# Patient Record
Sex: FEMALE | Race: WHITE
[De-identification: names, ages, dates, MRNs, and addresses within clinical notes are randomized per-mention and may not be internally consistent; named-entity substitution may affect disease eponyms.]

---

## 2017-01-12 ENCOUNTER — HOSPITAL ENCOUNTER (EMERGENCY)
Dept: HOSPITAL 62 - ER | Age: 27
Discharge: HOME | End: 2017-01-12
Payer: MEDICAID

## 2017-01-12 VITALS — DIASTOLIC BLOOD PRESSURE: 83 MMHG | SYSTOLIC BLOOD PRESSURE: 124 MMHG

## 2017-01-12 DIAGNOSIS — N93.9: ICD-10-CM

## 2017-01-12 DIAGNOSIS — R10.9: ICD-10-CM

## 2017-01-12 DIAGNOSIS — O02.1: Primary | ICD-10-CM

## 2017-01-12 DIAGNOSIS — F17.210: ICD-10-CM

## 2017-01-12 LAB
ALBUMIN SERPL-MCNC: 3.2 G/DL (ref 3.5–5)
ALP SERPL-CCNC: 55 U/L (ref 38–126)
ALT SERPL-CCNC: 11 U/L (ref 9–52)
ANION GAP SERPL CALC-SCNC: 11 MMOL/L (ref 5–19)
AST SERPL-CCNC: 13 U/L (ref 14–36)
BASOPHILS # BLD AUTO: 0 10^3/UL (ref 0–0.2)
BASOPHILS NFR BLD AUTO: 0.5 % (ref 0–2)
BILIRUB DIRECT SERPL-MCNC: 0 MG/DL (ref 0–0.3)
BILIRUB SERPL-MCNC: 0.4 MG/DL (ref 0.2–1.3)
BUN SERPL-MCNC: 7 MG/DL (ref 7–20)
CALCIUM: 8.9 MG/DL (ref 8.4–10.2)
CHLORIDE SERPL-SCNC: 105 MMOL/L (ref 98–107)
CO2 SERPL-SCNC: 23 MMOL/L (ref 22–30)
CREAT SERPL-MCNC: 0.6 MG/DL (ref 0.52–1.25)
EOSINOPHIL # BLD AUTO: 0.3 10^3/UL (ref 0–0.6)
EOSINOPHIL NFR BLD AUTO: 3.1 % (ref 0–6)
ERYTHROCYTE [DISTWIDTH] IN BLOOD BY AUTOMATED COUNT: 12.8 % (ref 11.5–14)
GLUCOSE SERPL-MCNC: 80 MG/DL (ref 75–110)
HCT VFR BLD CALC: 30.5 % (ref 36–47)
HGB BLD-MCNC: 10.5 G/DL (ref 12–15.5)
HGB HCT DIFFERENCE: 1
LYMPHOCYTES # BLD AUTO: 1.7 10^3/UL (ref 0.5–4.7)
LYMPHOCYTES NFR BLD AUTO: 15.6 % (ref 13–45)
MCH RBC QN AUTO: 32 PG (ref 27–33.4)
MCHC RBC AUTO-ENTMCNC: 34.5 G/DL (ref 32–36)
MCV RBC AUTO: 93 FL (ref 80–97)
MONOCYTES # BLD AUTO: 0.8 10^3/UL (ref 0.1–1.4)
MONOCYTES NFR BLD AUTO: 7.4 % (ref 3–13)
NEUTROPHILS # BLD AUTO: 7.9 10^3/UL (ref 1.7–8.2)
NEUTS SEG NFR BLD AUTO: 73.4 % (ref 42–78)
POTASSIUM SERPL-SCNC: 3.8 MMOL/L (ref 3.6–5)
PROT SERPL-MCNC: 5.7 G/DL (ref 6.3–8.2)
RBC # BLD AUTO: 3.29 10^6/UL (ref 3.72–5.28)
SODIUM SERPL-SCNC: 138.8 MMOL/L (ref 137–145)
WBC # BLD AUTO: 10.7 10^3/UL (ref 4–10.5)

## 2017-01-12 PROCEDURE — 85025 COMPLETE CBC W/AUTO DIFF WBC: CPT

## 2017-01-12 PROCEDURE — 36415 COLL VENOUS BLD VENIPUNCTURE: CPT

## 2017-01-12 PROCEDURE — 84702 CHORIONIC GONADOTROPIN TEST: CPT

## 2017-01-12 PROCEDURE — 80053 COMPREHEN METABOLIC PANEL: CPT

## 2017-01-12 PROCEDURE — 99284 EMERGENCY DEPT VISIT MOD MDM: CPT

## 2017-01-12 PROCEDURE — 76817 TRANSVAGINAL US OBSTETRIC: CPT

## 2017-01-12 NOTE — ER DOCUMENT REPORT
ED GI/





- General


Information source: Patient


TRAVEL OUTSIDE OF THE [place] IN LAST 30 DAYS: No





- HPI


Patient complains to provider of: Vaginal bleeding, Vaginal discharge


Onset: Yesterday


Timing/Duration: Gradual, Worse


Quality of pain: Cramping


Vaginal bleeding (Compared to normal period): Heavier, Passing clots


Para: 1


Associated symptoms: Vaginal discharge





<ERROL MORRIS - Last Filed: 17 08:31>





<GASTON WHITFIELD - Last Filed: 17 11:03>





- General


Chief Complaint: Abdominal Pain


Stated Complaint: ABDOMINAL PAIN


Notes: 


Patient is a pregnant 26-year-old female presenting to the emergency department 

concerned of vaginal bleeding onset last night.  Patient states that at 

approximately 3 AM she passed a sac with her vaginal bleeding, and her bleeding 

has worsened since then with multiple other small clots.  Patient states that 

she has been cramping for the past 4-5 days, and now she is experiencing pain 

in her abdomen and back.  Patient has a history of miscarriage, most recent 

being 2016.  Patient was seen here 2016 where she had a positive 

pregnancy test, but there was nothing visible on the ultrasound.


 (ERROL MORRIS)





- Related Data


Allergies/Adverse Reactions: 


 





amoxicillin [Amoxicillin] Allergy (Verified 17 07:30)


 


clindamycin Allergy (Verified 17 07:30)


 


hydrocortisone Allergy (Verified 17 07:30)


 


Penicillins Allergy (Verified 17 07:30)


 











Past Medical History





- General


Information source: Patient





- Social History


Smoking Status: Current Every Day Smoker


Chew tobacco use (# tins/day): No


Frequency of alcohol use: None


Drug Abuse: None


Family History: Reviewed & Not Pertinent


Patient has suicidal ideation: No


Patient has homicidal ideation: No


Renal/ Medical History: Reports: Other - Miscarriage


Psychiatric Medical History: Reports: Hx Anxiety, Hx Depression


Past Surgical History: Reports: Hx  Section - 2009 emergency





- Immunizations


Hx Diphtheria, Pertussis, Tetanus Vaccination: Yes





<ERROL MORRIS - Last Filed: 17 08:31>





Review of Systems





- Review of Systems


Constitutional: No symptoms reported


EENT: No symptoms reported


Cardiovascular: No symptoms reported


Respiratory: No symptoms reported


Gastrointestinal: See HPI, Abdominal pain - with accompanied back pain


Genitourinary: No symptoms reported


Female Genitourinary: See HPI, Pregnant, Vaginal discharge, Vaginal bleeding, 

Other - Cramping


Musculoskeletal: No symptoms reported


Skin: No symptoms reported


Hematologic/Lymphatic: No symptoms reported


Neurological/Psychological: No symptoms reported


-: Yes All other systems reviewed and negative





<ERROL MORRIS - Last Filed: 17 08:31>





Physical Exam





- Vital signs


Interpretation: Tachycardic





- General


General appearance: Alert





- HEENT


Head: Normocephalic, Atraumatic


Eyes: Normal


Pupils: PERRL





- Respiratory


Respiratory status: No respiratory distress


Chest status: Nontender


Breath sounds: Normal


Chest palpation: Normal





- Cardiovascular


Rhythm: Regular


Heart sounds: Normal auscultation


Murmur: No





- Abdominal


Inspection: Obese


Distension: No distension


Bowel sounds: Normal


Tenderness: Tender - Diffuse tenderness to palpation


Organomegaly: No organomegaly





- Back


Back: Normal, Nontender





- Extremities


General upper extremity: Normal inspection, Nontender, Normal color, Normal ROM

, Normal temperature


General lower extremity: Normal inspection, Nontender, Normal color, Normal ROM

, Normal temperature





- Neurological


Neuro grossly intact: Yes


Cognition: Normal


[place] Coma Scale Eye Opening: Spontaneous


Nini Coma Scale Verbal: Oriented


[place] Coma Scale Motor: Obeys Commands


[place] Coma Scale Total: 15


Speech: Normal





- Psychological


Associated symptoms: Normal affect, Tearful





- Skin


Skin Temperature: Warm


Skin Moisture: Dry


Skin Color: Normal





<ERROL MORRIS - Last Filed: 17 08:31>





Course





- Laboratory


Result Diagrams: 


 17 08:44





 17 08:44





<GASTON WHITFIELD - Last Filed: 17 11:03>





- Vital Signs


Vital signs: 





 











Temp Pulse Resp BP Pulse Ox


 


 98.5 F   110 H  16   132/76 H  100 


 


 17 07:30  17 07:30  17 07:30  17 07:30  17 07:30








 (ERROL MORRIS)


 (GASTON WHITFIELD)





- Laboratory


Laboratory results interpreted by me: 





 











  17





  08:44 08:44


 


WBC  10.7 H 


 


RBC  3.29 L 


 


Hgb  10.5 L 


 


Hct  30.5 L 


 


AST   13 L


 


Total Protein   5.7 L


 


Albumin   3.2 L


 


Beta HCG, Quant   14435.00 H











 (GASTON WHITFIELD)





Discharge





<ERROL MORRIS - Last Filed: 17 08:31>





<GASTON WHITFIELD - Last Filed: 17 11:03>





- Discharge


Clinical Impression: 


 Missed 





Condition: Stable


Disposition: HOME, SELF-CARE


Additional Instructions: 


Miscarriage:





     You have PROBABLY had a miscarriage (medically called a "spontaneous 

").  The miscarriage occurred because the fetus did not develop 

normally.  There is nothing you did to cause it, and nothing you could have 

done to prevent it.  About one pregnancy in four ends in miscarriage.


     You should rest in bed for two or three days.  As there is some risk of 

infection of the uterus, you should not have intercourse for one week (or until 

okayed by your physician).


     You might not have a period for six to eight weeks.  You should not become 

pregnant again for at least three months -- the uterus requires time to get 

back to normal.


     Call the doctor or return for re-examination if there is heavy or 

persistent vaginal bleeding, fever, foul discharge, continued cramping pains, 

or abdominal pain.














////////////////////////////////////////////////////////////////////////////////

////////////////////////////////////////////////////////////////////////////////

/////////////////


The ultrasound did not show a pregnancy, it did show thickened endometrium 

which is suggestive of a recent pregnancy and miscarriage.


The pregnancy hormone level was low for dates.


You will need repeat pregnancy hormone level testing to ensure if the level is 

returning back to zero.


Follow-up with your doctor, or with [facility] on Monday.





RETURN TO THE EMERGENCY ROOM IF ANY NEW OR WORSENING SYMPTOMS.








Referrals: 


[facility] MD [Primary Care Provider] - Follow up in 3-5 days


[facility] [Provider Group] - Follow up in 3-5 days


Scribe Attestation: 





17 11:03


I personally performed the services described in the documentation, reviewed 

and edited the documentation which was dictated to the scribe in my presence, 

and it accurately records my words and actions. (GASTON WHITFIELD)





Scribe Documentation





<ERROL MORRIS - Last Filed: 17 08:31>





<GASTON WHITFIELD - Last Filed: 17 11:03>





- Scribe


Written by Scribe:: 


GASTON WHITFIELD MD, SCRIBE  [id number]





Acting as scribe for:





Dr. Whitfield (ERROL MORRIS)


 (GASTON WHITFIELD)

## 2017-05-07 ENCOUNTER — HOSPITAL ENCOUNTER (EMERGENCY)
Dept: HOSPITAL 62 - ER | Age: 27
Discharge: HOME | End: 2017-05-07
Payer: MEDICAID

## 2017-05-07 VITALS — DIASTOLIC BLOOD PRESSURE: 81 MMHG | SYSTOLIC BLOOD PRESSURE: 126 MMHG

## 2017-05-07 DIAGNOSIS — F17.200: ICD-10-CM

## 2017-05-07 DIAGNOSIS — T63.301A: Primary | ICD-10-CM

## 2017-05-07 PROCEDURE — 99281 EMR DPT VST MAYX REQ PHY/QHP: CPT

## 2017-05-07 NOTE — ER DOCUMENT REPORT
HPI





- HPI


Patient complains to provider of: insect bite


Pain Level: 3


Context: 


25 yo female c/o spider bite to left thigh x 1 day. 


Associated Symptoms: None


Exacerbated by: Denies


Relieved by: Denies


Similar symptoms previously: No





- ROS


Systems Reviewed and Negative: Yes All other systems reviewed and negative





- REPRODUCTIVE


Reproductive: DENIES: Pregnant:





- DERM


Skin Color: Normal





Past Medical History





- General


Information source: Patient





- Social History


Smoking Status: Current Every Day Smoker


Frequency of alcohol use: None


Drug Abuse: None


Lives with: Family


Family History: Reviewed & Not Pertinent


Patient has suicidal ideation: No


Patient has homicidal ideation: No


Renal/ Medical History: Denies: Hx Peritoneal Dialysis


Psychiatric Medical History: Reports: Hx Anxiety, Hx Depression


Past Surgical History: Reports: Hx  Section - 2009 emergency





- Immunizations


Hx Diphtheria, Pertussis, Tetanus Vaccination: Yes





Vertical Provider Document





- CONSTITUTIONAL


Agree With Documented VS: Yes


Exam Limitations: No Limitations





- INFECTION CONTROL


TRAVEL OUTSIDE OF THE [place] IN LAST 30 DAYS: No





- HEENT


HEENT: Atraumatic, PERRLA





- NECK


Neck: Normal Inspection, Supple





- RESPIRATORY


Respiratory: Breath Sounds Normal, No Respiratory Distress


O2 Sat by Pulse Oximetry: 100





- CARDIOVASCULAR


Cardiovascular: Regular Rate, Regular Rhythm





- NEURO


Level of Consciousness: Awake, Alert





- DERM


Integumentary: Warm, Dry, Rash - discrete 2mm erosive lesion to left anterior 

thigh.  mild surrounding erythema.  no lymphangitis.  no drainage.





Course





- Vital Signs


Vital signs: 


 











Temp Pulse Resp BP Pulse Ox


 


 98.4 F   80   20   128/67 H  100 


 


 17 12:11  17 12:11  17 12:11  17 12:11  17 12:11














Discharge





- Discharge


Clinical Impression: 


Insect bite


Qualifiers:


 Encounter type: initial encounter Qualified Code(s): W57.XXXA - Bitten or 

stung by nonvenomous insect and other nonvenomous arthropods, initial encounter





Condition: Stable


Disposition: HOME, SELF-CARE


Instructions:  Insect Bites ([facility]), Antibiotic Therapy ([facility]), Antibiotic 

Ointment Protection ([facility]), Ultram ([facility])


Additional Instructions: 


clean wound with antibacterial soap and water,


apply topical antibiotic after cleaning


take oral antibiotics as prescribed


follow up with primary care or return to ER for any worsening


Prescriptions: 


Clindamycin HCl [Cleocin HCl] 150 mg PO QID #20 capsule


Tramadol HCl [Ultram 50 mg Tablet] 50 mg PO Q4H PRN #15 tablet


 PRN Reason: 


Forms:  Return to Work

## 2017-05-15 ENCOUNTER — HOSPITAL ENCOUNTER (OUTPATIENT)
Dept: HOSPITAL 62 - LAB | Age: 27
End: 2017-05-15
Attending: CLINIC/CENTER
Payer: MEDICAID

## 2017-05-15 DIAGNOSIS — O02.1: Primary | ICD-10-CM

## 2017-05-15 PROCEDURE — 36415 COLL VENOUS BLD VENIPUNCTURE: CPT

## 2017-05-15 PROCEDURE — 84702 CHORIONIC GONADOTROPIN TEST: CPT

## 2017-05-31 ENCOUNTER — HOSPITAL ENCOUNTER (OUTPATIENT)
Dept: HOSPITAL 62 - RAD | Age: 27
End: 2017-05-31
Attending: NURSE PRACTITIONER
Payer: MEDICAID

## 2017-05-31 DIAGNOSIS — Z34.81: Primary | ICD-10-CM

## 2017-05-31 PROCEDURE — 76817 TRANSVAGINAL US OBSTETRIC: CPT

## 2017-05-31 NOTE — RADIOLOGY REPORT (SQ)
EXAM DESCRIPTION:  U/S OB TRANSVAGINAL W/O DOP



COMPLETED DATE/TIME:  5/31/2017 3:18 pm



REASON FOR STUDY:  ENCOUNTER FOR SUPERVISION OF OTHER NORMAL PREGNANCY Z34.81  ENCOUNTER FOR SUPRVSN 
OF NORMAL PREGNANCY, FIRST TRIM



COMPARISON:  None.



TECHNIQUE:  Transvaginal static and realtime grayscale images acquired of the pelvis. Additional gonzález
cted spectral and color Doppler images recorded. All images stored on PACs.

bHCG: Not available.



LIMITATIONS:  None.



FINDINGS:  FETUS: Living intrauterine pregnancy.

EGA: 7 week 1 day.

MO: 1/16/2018.

FHR: 131  beats per minute.

SUBCHORIONIC BLEED: No.

SIZE OF BLEED: Not applicable.

UTERUS: No masses. No anomalies.

CERVICAL LENGTH: 4.4 cm.   Closed.

RIGHT ADNEXA: Normal ovary with normal vascular flow.

No adnexal free fluid.

No adnexal masses.

LEFT ADNEXA: Normal ovary with normal vascular flow.

No adnexal free fluid.

No adnexal masses.

FREE FLUID: Small amount of free fluid.

OTHER: No other significant finding.



IMPRESSION:  LIVING INTRAUTERINE PREGNANCY.

EGA 7 WEEK 1 DAY.

Trimester of pregnancy:  First - 0 to 13 weeks.



TECHNICAL DOCUMENTATION:  JOB ID:  [id number]

 2011 [facility]- All Rights Reserved

## 2017-06-16 ENCOUNTER — HOSPITAL ENCOUNTER (EMERGENCY)
Dept: HOSPITAL 62 - ER | Age: 27
Discharge: HOME | End: 2017-06-16
Payer: MEDICAID

## 2017-06-16 VITALS — DIASTOLIC BLOOD PRESSURE: 63 MMHG | SYSTOLIC BLOOD PRESSURE: 121 MMHG

## 2017-06-16 DIAGNOSIS — Z88.0: ICD-10-CM

## 2017-06-16 DIAGNOSIS — O21.1: Primary | ICD-10-CM

## 2017-06-16 DIAGNOSIS — Z87.891: ICD-10-CM

## 2017-06-16 DIAGNOSIS — Z88.3: ICD-10-CM

## 2017-06-16 DIAGNOSIS — Z3A.09: ICD-10-CM

## 2017-06-16 LAB
ALBUMIN SERPL-MCNC: 5 G/DL (ref 3.5–5)
ALP SERPL-CCNC: 85 U/L (ref 38–126)
ALT SERPL-CCNC: 35 U/L (ref 9–52)
ANION GAP SERPL CALC-SCNC: 18 MMOL/L (ref 5–19)
APPEARANCE UR: (no result)
AST SERPL-CCNC: 25 U/L (ref 14–36)
BASOPHILS NFR BLD MANUAL: 0 % (ref 0–2)
BILIRUB DIRECT SERPL-MCNC: 0.3 MG/DL (ref 0–0.4)
BILIRUB SERPL-MCNC: 1.1 MG/DL (ref 0.2–1.3)
BILIRUB UR QL STRIP: (no result)
BUN SERPL-MCNC: 14 MG/DL (ref 7–20)
CALCIUM: 10.4 MG/DL (ref 8.4–10.2)
CHLORIDE SERPL-SCNC: 103 MMOL/L (ref 98–107)
CO2 SERPL-SCNC: 17 MMOL/L (ref 22–30)
CREAT SERPL-MCNC: 0.65 MG/DL (ref 0.52–1.25)
EOSINOPHIL NFR BLD MANUAL: 0 % (ref 0–6)
ERYTHROCYTE [DISTWIDTH] IN BLOOD BY AUTOMATED COUNT: 14.6 % (ref 11.5–14)
GLUCOSE SERPL-MCNC: 133 MG/DL (ref 75–110)
GLUCOSE UR STRIP-MCNC: NEGATIVE MG/DL
HCT VFR BLD CALC: 47.8 % (ref 36–47)
HGB BLD-MCNC: 15.9 G/DL (ref 12–15.5)
HGB HCT DIFFERENCE: -0.1
KETONES UR STRIP-MCNC: 80 MG/DL
MCH RBC QN AUTO: 30.3 PG (ref 27–33.4)
MCHC RBC AUTO-ENTMCNC: 33.3 G/DL (ref 32–36)
MCV RBC AUTO: 91 FL (ref 80–97)
NITRITE UR QL STRIP: NEGATIVE
OVALOCYTES BLD QL SMEAR: SLIGHT
PH UR STRIP: 5 [PH] (ref 5–9)
POIKILOCYTOSIS BLD QL SMEAR: SLIGHT
POTASSIUM SERPL-SCNC: 3.8 MMOL/L (ref 3.6–5)
PROT SERPL-MCNC: 8.4 G/DL (ref 6.3–8.2)
PROT UR STRIP-MCNC: 100 MG/DL
RBC # BLD AUTO: 5.24 10^6/UL (ref 3.72–5.28)
SODIUM SERPL-SCNC: 138.1 MMOL/L (ref 137–145)
SP GR UR STRIP: 1.03
TOTAL CELLS COUNTED BLD: 100
TOXIC GRANULES BLD QL SMEAR: SLIGHT
UROBILINOGEN UR-MCNC: NEGATIVE MG/DL (ref ?–2)
VARIANT LYMPHS NFR BLD MANUAL: 3 % (ref 13–45)
WBC # BLD AUTO: 22.5 10^3/UL (ref 4–10.5)

## 2017-06-16 PROCEDURE — 36415 COLL VENOUS BLD VENIPUNCTURE: CPT

## 2017-06-16 PROCEDURE — 80053 COMPREHEN METABOLIC PANEL: CPT

## 2017-06-16 PROCEDURE — 81001 URINALYSIS AUTO W/SCOPE: CPT

## 2017-06-16 PROCEDURE — 81025 URINE PREGNANCY TEST: CPT

## 2017-06-16 PROCEDURE — 96376 TX/PRO/DX INJ SAME DRUG ADON: CPT

## 2017-06-16 PROCEDURE — 96361 HYDRATE IV INFUSION ADD-ON: CPT

## 2017-06-16 PROCEDURE — 96366 THER/PROPH/DIAG IV INF ADDON: CPT

## 2017-06-16 PROCEDURE — 99283 EMERGENCY DEPT VISIT LOW MDM: CPT

## 2017-06-16 PROCEDURE — 85025 COMPLETE CBC W/AUTO DIFF WBC: CPT

## 2017-06-16 PROCEDURE — 96375 TX/PRO/DX INJ NEW DRUG ADDON: CPT

## 2017-06-16 PROCEDURE — 96365 THER/PROPH/DIAG IV INF INIT: CPT

## 2017-06-16 NOTE — ER DOCUMENT REPORT
ED Medical Screen (RME)





- General


Chief Complaint: Vomiting


Stated Complaint: VOMITING/7 WEEKS PREGNANT


Notes: 


27 year old female,  at 10 weeks gestation by LMP from home pregnancy test, 

reports 3 days of vomiting with abdominal cramping mainly in her upper abdomen. 

Denies fever. No daily meds. PMH of complicated first pregnancy, no other 

medical history reported. She denies alcohol use.





TRAVEL OUTSIDE OF THE [place] IN LAST 30 DAYS: No





- Related Data


Allergies/Adverse Reactions: 


 





amoxicillin [Amoxicillin] Allergy (Verified 17 12:13)


 


clindamycin Allergy (Verified 17 12:13)


 


hydrocortisone Allergy (Verified 17 12:13)


 


Penicillins Allergy (Verified 17 12:13)


 











Past Medical History


Renal/ Medical History: Denies: Hx Peritoneal Dialysis


Psychiatric Medical History: Reports: Hx Anxiety, Hx Depression


Past Surgical History: Reports: Hx  Section -  emergency





- Immunizations


Hx Diphtheria, Pertussis, Tetanus Vaccination: Yes





Physical Exam





- Vital signs


Vitals: 





 











Temp Pulse Resp BP Pulse Ox


 


 98.0 F   88   20   135/86 H  98 


 


 17 04:32  17 04:32  17 04:32  17 04:32  17 04:32














- General


General appearance: Anxious - patient vomited in the room





- Abdominal


Tenderness: Tender - tender in upper abdomen mildly and slightly more tender in 

LUQ; no guarding





Course





- Vital Signs


Vital signs: 





 











Temp Pulse Resp BP Pulse Ox


 


 98.0 F   88   20   135/86 H  98 


 


 17 04:32  17 04:32  17 04:32  17 04:32  17 04:32

## 2017-06-16 NOTE — ER DOCUMENT REPORT
ED GI/





- General


Mode of Arrival: Ambulatory


Information source: Patient


TRAVEL OUTSIDE OF THE [place] IN LAST 30 DAYS: No





- HPI


Patient complains to provider of: Vomiting


Onset: Other - 3 days ago


Timing/Duration: Sudden, Persistent


: 2


Para: 1


Abortions: 0





<ERROL MORRIS - Last Filed: 17 07:14>





<GASTON WHITFIELD - Last Filed: 17 10:21>





- General


Chief Complaint: Vomiting


Stated Complaint: VOMITING/7 WEEKS PREGNANT


Time Seen by Provider: 17 06:35


Notes: 


Patient is a  almost 10 weeks pregnant female presenting to the emergency 

department with chief complaint of nausea and vomiting onset approximately 3 

days ago.  Patient states that usually she only experiences a brief morning 

sickness, and is able to go about her day after she drinks orange juice and 

goes for a walk.  However, the past 3 days she is not been able to eat very much

, and has been dry heaving.  Patient states that she feels a lot better after 

receiving Reglan and 2 L of fluid on arrival to the emergency department.  

Patient reports quitting smoking approximately 2 weeks ago and having an 

emergency  back in May 2009


 (ERROL MORRIS)





- Related Data


Allergies/Adverse Reactions: 


 





amoxicillin [Amoxicillin] Allergy (Verified 17 12:13)


 


clindamycin Allergy (Verified 17 12:13)


 


hydrocortisone Allergy (Verified 17 12:13)


 


Penicillins Allergy (Verified 17 12:13)


 











Past Medical History





- General


Information source: Patient





- Social History


Smoking Status: Former Smoker - Quit 2 weeks ago


Family History: Reviewed & Not Pertinent


Patient has suicidal ideation: No


Patient has homicidal ideation: No


Psychiatric Medical History: Reports: Hx Anxiety, Hx Depression


Past Surgical History: Reports: Hx  Section - May 2009 emergency





- Immunizations


Hx Diphtheria, Pertussis, Tetanus Vaccination: Yes





<ERROL MORRIS - Last Filed: 17 07:14>





Review of Systems





- Review of Systems


Constitutional: No symptoms reported


EENT: No symptoms reported


Cardiovascular: No symptoms reported


Respiratory: No symptoms reported


Gastrointestinal: See HPI, Nausea, Vomiting


Genitourinary: No symptoms reported


Female Genitourinary: See HPI, Pregnant


Musculoskeletal: No symptoms reported


Skin: No symptoms reported


Hematologic/Lymphatic: No symptoms reported


Neurological/Psychological: No symptoms reported


-: Yes All other systems reviewed and negative





<ERROL MORRIS - Last Filed: 17 07:14>





Physical Exam





- General


General appearance: Appears well, Alert





- HEENT


Head: Normocephalic, Atraumatic


Eyes: Normal


Pupils: PERRL





- Respiratory


Respiratory status: No respiratory distress


Chest status: Nontender


Breath sounds: Normal


Chest palpation: Normal





- Cardiovascular


Rhythm: Regular


Heart sounds: Normal auscultation


Murmur: No





- Abdominal


Inspection: Gravid female - soft


Distension: No distension


Bowel sounds: Normal


Tenderness: Nontender


Organomegaly: No organomegaly





- Back


Back: Normal, Nontender





- Extremities


General upper extremity: Normal inspection, Nontender


General lower extremity: Normal inspection, Nontender





- Neurological


Neuro grossly intact: Yes


Cognition: Normal


Orientation: AAOx4


[place] Coma Scale Eye Opening: Spontaneous


[place] Coma Scale Verbal: Oriented


[place] Coma Scale Motor: Obeys Commands


N[place] Coma Scale Total: 15


Speech: Normal





- Psychological


Associated symptoms: Normal affect, Normal mood





- Skin


Skin Temperature: Warm


Skin Moisture: Dry


Skin Color: Normal





<ARTUROERROL - Last Filed: 17 07:14>





Course





- Laboratory


Result Diagrams: 


 17 05:25





 17 05:25





<ARTUROERROL - Last Filed: 17 07:14>





- Laboratory


Result Diagrams: 


 17 05:25





 17 05:25





<GASTON WHITFIELD - Last Filed: 17 10:21>





- Re-evaluation


Re-evalutation: 





17 10:17


Patient has had 4 L of fluid.  The most recent dose of Reglan helped nausea 

quite a bit.  She feels much better and ready to go home. (GASTON WHITFIELD)





- Vital Signs


Vital signs: 





 











Temp Pulse Resp BP Pulse Ox


 


 98.0 F   88   20   135/86 H  98 


 


 17 04:32  17 04:32  17 04:32  17 04:32  17 04:32














- Laboratory


Laboratory results interpreted by me: 





 











  17





  05:25 05:25 06:25


 


WBC  22.5 H  


 


Hgb  15.9 H  


 


Hct  47.8 H  


 


RDW  14.6 H  


 


Seg Neuts % (Manual)  94 H  


 


Lymphocytes % (Manual)  3 L  


 


Abs Neuts (Manual)  21.2 H  


 


Carbon Dioxide   17 L 


 


Glucose   133 H 


 


Calcium   10.4 H 


 


Total Protein   8.4 H 


 


Urine Protein    100 H


 


Urine Ketones    80 H


 


Urine Bilirubin    SMALL H


 


Urine HCG, Qual    POSITIVE H














Discharge





<ERROL MORRIS - Last Filed: 17 07:14>





<GASTON WHITFIELD - Last Filed: 17 10:21>





- Discharge


Clinical Impression: 


 Hyperemesis gravidarum with dehydration





Condition: Stable


Disposition: HOME, SELF-CARE


Additional Instructions: 


Hyperemesis Gravidarum:





     Hyperemesis gravidarum is the medical term for severe vomiting during 

pregnancy.  We don't know exactly why it occurs, but it's a common problem.


     Dehydration can occur.  This reduces blood flow to the placenta, 

decreasing the baby's nourishment.  The baby will also become dehydrated.  

There can be harmful changes in blood sodium, potassium, or acid balance.


     Our goal is to correct, and prevent, dehydration.  For severe cases, we 

give IV fluids.  Antinausea medication will be prescribed. (Don't be concerned 

about "birth defects" -- the risk to you and your baby from the hyperemesis is 

the biggest problem.  The antinausea medication is very safe at this stage of 

pregnancy.)


     Call the doctor if you have vaginal bleeding, abdominal pain, severe 

lightheadedness or weakness, or other alarming symptoms.











TAKE THE MEDICATION AS PRESCRIBED FOR NAUSEA.   SUPPLEMENT WITH BENADRYL IF 

NEEDED.


FOLLOW UP WITH [facility] IF NOT IMPROVING.





RETURN TO THE EMERGENCY ROOM IF ANY NEW OR WORSENING SYMPTOMS.








Prescriptions: 


Metoclopramide HCl [Reglan 10 mg Tablet] 10 mg PO Q4 PRN #20 tablet


 PRN Reason: 


Referrals: 


[facility] [Provider Group] - Follow up as needed


Scribe Attestation: 





17 10:20


I personally performed the services described in the documentation, reviewed 

and edited the documentation which was dictated to the scribe in my presence, 

and it accurately records my words and actions. (GASTON WHITFIELD)





Scribe Documentation





- Scribe


Written by Scribe:: Dahiana Young, 2017 [id number]


acting as scribe for :: Veronica





<ERROL MORRIS - Last Filed: 17 07:14>

## 2017-12-19 ENCOUNTER — HOSPITAL ENCOUNTER (OUTPATIENT)
Dept: HOSPITAL 62 - LC | Age: 27
Discharge: HOME | End: 2017-12-19
Attending: OBSTETRICS & GYNECOLOGY
Payer: MEDICAID

## 2017-12-19 DIAGNOSIS — Z34.93: Primary | ICD-10-CM

## 2017-12-19 PROCEDURE — 59025 FETAL NON-STRESS TEST: CPT

## 2017-12-19 PROCEDURE — 4A1HXCZ MONITORING OF PRODUCTS OF CONCEPTION, CARDIAC RATE, EXTERNAL APPROACH: ICD-10-PCS | Performed by: OBSTETRICS & GYNECOLOGY

## 2017-12-19 NOTE — NON STRESS TEST REPORT
=================================================================

Non Stress Test

=================================================================

Datetime Report Generated by CPN: 12/19/2017 14:44

   

   

=================================================================

DEMOGRAPHIC

=================================================================

   

EGA NST:  36.2

   

=================================================================

INDICATION

=================================================================

   

Indication for Study:  Ordered by Provider

   

=================================================================

MONITORING

=================================================================

   

Monitor Explained:  Monitor Explained; Test Explained; Patient

   Verbalized Understanding

Monitor Explained:  Monitor Explained; Test Explained; Patient

   Verbalized Understanding

Time on Monitor:  12/19/2017 12:53

Time on Monitor:  12/19/2017 12:53

Time off Monitor:  12/19/2017 14:26

NST Duration:  93

   

=================================================================

NST INTERVENTIONS

=================================================================

   

NST Interventions:  PO Hydration; Reposition Patient

NST Interventions:  PO Hydration; Reposition Patient

Physician Notified NST:  C Vogel CNM

BABY A:  [id number]

   

=================================================================

BABY A

=================================================================

   

Fetal Movement :  Present

Contraction Frequency :  none

FHR Baseline :  145

Accelerations :  15X15

Decelerations :  None

Variability :  Moderate 6-25bpm

NST Review:  Meets Criteria for Reactive NST

NST Review and Verified By :  Delmy George [facility]

NST Results:  Reactive

   

=================================================================

NST REPORT

=================================================================

   

Report Trigger:  Send Report

## 2017-12-21 ENCOUNTER — HOSPITAL ENCOUNTER (OUTPATIENT)
Dept: HOSPITAL 62 - LC | Age: 27
Discharge: HOME | End: 2017-12-21
Attending: OBSTETRICS & GYNECOLOGY
Payer: MEDICAID

## 2017-12-21 DIAGNOSIS — Z3A.36: ICD-10-CM

## 2017-12-21 DIAGNOSIS — O34.212: Primary | ICD-10-CM

## 2017-12-21 LAB
ADD MANUAL DIFF: NO
APPEARANCE UR: (no result)
APTT PPP: YELLOW S
BARBITURATES UR QL SCN: NEGATIVE
BASOPHILS # BLD AUTO: 0 10^3/UL (ref 0–0.2)
BASOPHILS NFR BLD AUTO: 0.4 % (ref 0–2)
BILIRUB UR QL STRIP: NEGATIVE
EOSINOPHIL # BLD AUTO: 0.1 10^3/UL (ref 0–0.6)
EOSINOPHIL NFR BLD AUTO: 0.8 % (ref 0–6)
ERYTHROCYTE [DISTWIDTH] IN BLOOD BY AUTOMATED COUNT: 14 % (ref 11.5–14)
GLUCOSE UR STRIP-MCNC: NEGATIVE MG/DL
HCT VFR BLD CALC: 37.2 % (ref 36–47)
HGB BLD-MCNC: 12.7 G/DL (ref 12–15.5)
KETONES UR STRIP-MCNC: NEGATIVE MG/DL
LYMPHOCYTES # BLD AUTO: 2.1 10^3/UL (ref 0.5–4.7)
LYMPHOCYTES NFR BLD AUTO: 17.7 % (ref 13–45)
MCH RBC QN AUTO: 31.5 PG (ref 27–33.4)
MCHC RBC AUTO-ENTMCNC: 34.2 G/DL (ref 32–36)
MCV RBC AUTO: 92 FL (ref 80–97)
METHADONE UR QL SCN: NEGATIVE
MONOCYTES # BLD AUTO: 0.7 10^3/UL (ref 0.1–1.4)
MONOCYTES NFR BLD AUTO: 6 % (ref 3–13)
NEUTROPHILS # BLD AUTO: 9 10^3/UL (ref 1.7–8.2)
NEUTS SEG NFR BLD AUTO: 75.1 % (ref 42–78)
NITRITE UR QL STRIP: NEGATIVE
PCP UR QL SCN: NEGATIVE
PH UR STRIP: 7 [PH] (ref 5–9)
PLATELET # BLD: 135 10^3/UL (ref 150–450)
PROT UR STRIP-MCNC: NEGATIVE MG/DL
RBC # BLD AUTO: 4.05 10^6/UL (ref 3.72–5.28)
SP GR UR STRIP: 1
TOTAL CELLS COUNTED % (AUTO): 100 %
URINE AMPHETAMINES SCREEN: NEGATIVE
URINE BENZODIAZEPINES SCREEN: NEGATIVE
URINE COCAINE SCREEN: NEGATIVE
URINE MARIJUANA (THC) SCREEN: NEGATIVE
UROBILINOGEN UR-MCNC: NEGATIVE MG/DL (ref ?–2)
WBC # BLD AUTO: 11.9 10^3/UL (ref 4–10.5)

## 2017-12-21 PROCEDURE — 4A1HXCZ MONITORING OF PRODUCTS OF CONCEPTION, CARDIAC RATE, EXTERNAL APPROACH: ICD-10-PCS | Performed by: OBSTETRICS & GYNECOLOGY

## 2017-12-21 PROCEDURE — 59025 FETAL NON-STRESS TEST: CPT

## 2017-12-21 NOTE — NON STRESS TEST REPORT
=================================================================

Non Stress Test

=================================================================

Datetime Report Generated by CPN: 12/21/2017 12:25

   

   

=================================================================

DEMOGRAPHIC

=================================================================

   

EGA NST:  36.4

   

=================================================================

INDICATION

=================================================================

   

Indication for Study:  Ordered by Provider

   

=================================================================

VITAL SIGNS

=================================================================

   

Temperature - NST:  97.6

Pulse - NST:  76

RESP - NST:  16

NBPSYS NST:  116

NBPDIA NST:  64

   

=================================================================

MONITORING

=================================================================

   

Monitor Explained:  Monitor Explained; Test Explained; Patient

   Verbalized Understanding

Time on Monitor:  12/21/2017 11:59

Time off Monitor:  12/21/2017 12:18

NST Duration:  19

   

=================================================================

NST INTERVENTIONS

=================================================================

   

NST Interventions:  PO Hydration; Reposition Patient

Physician Notified NST:  PHowie stoner, CNM

BABY A:  [id number]

   

=================================================================

BABY A

=================================================================

   

Fetal Movement :  Present

Contraction Frequency :  none

FHR Baseline :  145

Accelerations :  15X15

Decelerations :  None

Variability :  Moderate 6-25bpm

NST Review:  Meets Criteria for Reactive NST

NST Review and Verified By :  ESMER Stern Results:  Reactive

   

=================================================================

NST REPORT

=================================================================

   

Report Trigger:  Send Report

## 2017-12-26 ENCOUNTER — HOSPITAL ENCOUNTER (INPATIENT)
Dept: HOSPITAL 62 - 2S | Age: 27
LOS: 3 days | Discharge: HOME | End: 2017-12-29
Attending: OBSTETRICS & GYNECOLOGY | Admitting: OBSTETRICS & GYNECOLOGY
Payer: MEDICAID

## 2017-12-26 DIAGNOSIS — O34.211: Primary | ICD-10-CM

## 2017-12-26 DIAGNOSIS — D62: ICD-10-CM

## 2017-12-26 DIAGNOSIS — J45.909: ICD-10-CM

## 2017-12-26 DIAGNOSIS — Z3A.37: ICD-10-CM

## 2017-12-26 DIAGNOSIS — F41.8: ICD-10-CM

## 2017-12-26 DIAGNOSIS — N85.8: ICD-10-CM

## 2017-12-26 PROCEDURE — 86900 BLOOD TYPING SEROLOGIC ABO: CPT

## 2017-12-26 PROCEDURE — 86901 BLOOD TYPING SEROLOGIC RH(D): CPT

## 2017-12-26 PROCEDURE — 85025 COMPLETE CBC W/AUTO DIFF WBC: CPT

## 2017-12-26 PROCEDURE — 81001 URINALYSIS AUTO W/SCOPE: CPT

## 2017-12-26 PROCEDURE — 94799 UNLISTED PULMONARY SVC/PX: CPT

## 2017-12-26 PROCEDURE — 36415 COLL VENOUS BLD VENIPUNCTURE: CPT

## 2017-12-26 PROCEDURE — 80307 DRUG TEST PRSMV CHEM ANLYZR: CPT

## 2017-12-26 PROCEDURE — C1765 ADHESION BARRIER: HCPCS

## 2017-12-26 PROCEDURE — 86850 RBC ANTIBODY SCREEN: CPT

## 2017-12-26 PROCEDURE — 85027 COMPLETE CBC AUTOMATED: CPT

## 2017-12-26 PROCEDURE — 59025 FETAL NON-STRESS TEST: CPT

## 2017-12-26 RX ADMIN — DOCUSATE SODIUM SCH MG: 100 CAPSULE, LIQUID FILLED ORAL at 11:17

## 2017-12-26 RX ADMIN — KETOROLAC TROMETHAMINE SCH MG: 30 INJECTION, SOLUTION INTRAMUSCULAR at 17:16

## 2017-12-26 RX ADMIN — OXYCODONE AND ACETAMINOPHEN PRN TAB: 5; 325 TABLET ORAL at 19:20

## 2017-12-26 RX ADMIN — CEFAZOLIN SODIUM ONE ML: 2 SOLUTION INTRAVENOUS at 11:17

## 2017-12-26 RX ADMIN — CEFAZOLIN SODIUM ONE MLS: 2 SOLUTION INTRAVENOUS at 07:40

## 2017-12-26 RX ADMIN — OXYCODONE AND ACETAMINOPHEN PRN TAB: 5; 325 TABLET ORAL at 11:49

## 2017-12-26 RX ADMIN — KETOROLAC TROMETHAMINE SCH MG: 30 INJECTION, SOLUTION INTRAMUSCULAR at 10:17

## 2017-12-26 RX ADMIN — DOCUSATE SODIUM SCH MG: 100 CAPSULE, LIQUID FILLED ORAL at 17:18

## 2017-12-26 RX ADMIN — HYDROMORPHONE HYDROCHLORIDE PRN MG: 2 INJECTION INTRAMUSCULAR; INTRAVENOUS; SUBCUTANEOUS at 20:25

## 2017-12-26 RX ADMIN — Medication SCH CAP: at 11:17

## 2017-12-26 RX ADMIN — HYDROMORPHONE HYDROCHLORIDE PRN MG: 2 INJECTION INTRAMUSCULAR; INTRAVENOUS; SUBCUTANEOUS at 15:03

## 2017-12-26 NOTE — BRIEF OPERATIVE NOTE
BRIEF OPERATIVE REPORT


DATE OF SURGERY: 17


TIME OF SURGERY: 09:00


PREOPERATIVE DIAGNOSIS: H/o Classical Section, 37+2ega, , Abnormal AFP, 

Normal NIPS


POSTOPERATIVE DIAGNOSIS: ALINE - delivered


SURGEON: HALEY MIKE


FINDINGS: classical incision from prior  section visible, VMI delivered 

at 0823, weigth 6#6oz, Apars 8/, IVF 2850ml, UOP 200ml


COMPLICATIONS: 


None


ESTIMATED BLOOD LOSS: 600ml


TISSUE REMOVED OR ALTERED: placenta and cord - not sent to pathology


TECHNICAL PROCEDURE: Repeat LTCS

## 2017-12-26 NOTE — PDOC DELIVERY SUMMARY
Delivery Summary





- Maternal


Hx : III


Hx # Term Pregnancies: 0


Hx #  Pregnancies: 1


Hx Total # of Abortions (Sponateous & Elective): 1


Number of Living Children: 1


MO: 18


Gestational Age: 37


Prenatal Risk Factors: Previous 


Ruptured Membranes: AROM


Time of Rupture: 08:21


Fluids: Clear





- Delivery


Labor: Not In Labor


Presentation: Vertex


Fetal Heart Rate Monitoring: Done Pre-Operatively


Uterine Contraction Monitoring: External


Support Person Present: Yes


Location: OR


: Scheduled


Placenta: Within Normal Limits


Placenta Description: normal


Number of Vessels (Cord): 3


Nuchal Cord: Yes


Delivery of Placenta Date: 17


Delivery of Placenta Time: 08:24





- Medications


Type of Anesthesia:: Spinal





- **Delivery Medications


Delivery Meds Comment: Pitocin





- Infant Assess and Care


  ** Baby 1 Male


Delivery of Infant Date: 17


Delivery of Infant Time: 08:23


Apgar at 1 minute: 8


Apgar at 5 minutes: 9


Preprinted Number On Band: [id number]


Infant Medical Record Number: [id number]


To Nursery At: 08:31


Mode of Transport: Bassinet


Infant Delivery Weight: 2,895


Infant Delivery Length: 20 in





- Delivery Personnel


Neonatologist: DR IRVIN Hoover RN: CASTRO NEVES


RN: LEOBARDO HIGGINS MD: HALEY MIKE

## 2017-12-27 LAB
ERYTHROCYTE [DISTWIDTH] IN BLOOD BY AUTOMATED COUNT: 14.1 % (ref 11.5–14)
HCT VFR BLD CALC: 32 % (ref 36–47)
HGB BLD-MCNC: 10.9 G/DL (ref 12–15.5)
MCH RBC QN AUTO: 31 PG (ref 27–33.4)
MCHC RBC AUTO-ENTMCNC: 34 G/DL (ref 32–36)
MCV RBC AUTO: 91 FL (ref 80–97)
PLATELET # BLD: 106 10^3/UL (ref 150–450)
RBC # BLD AUTO: 3.52 10^6/UL (ref 3.72–5.28)
WBC # BLD AUTO: 13 10^3/UL (ref 4–10.5)

## 2017-12-27 RX ADMIN — OXYCODONE AND ACETAMINOPHEN PRN TAB: 5; 325 TABLET ORAL at 14:55

## 2017-12-27 RX ADMIN — IBUPROFEN SCH MG: 800 TABLET, FILM COATED ORAL at 23:53

## 2017-12-27 RX ADMIN — HYDROMORPHONE HYDROCHLORIDE PRN MG: 2 INJECTION INTRAMUSCULAR; INTRAVENOUS; SUBCUTANEOUS at 03:47

## 2017-12-27 RX ADMIN — IBUPROFEN SCH MG: 800 TABLET, FILM COATED ORAL at 11:12

## 2017-12-27 RX ADMIN — Medication SCH CAP: at 11:11

## 2017-12-27 RX ADMIN — OXYCODONE AND ACETAMINOPHEN PRN TAB: 5; 325 TABLET ORAL at 00:35

## 2017-12-27 RX ADMIN — Medication SCH CAP: at 09:23

## 2017-12-27 RX ADMIN — DOCUSATE SODIUM SCH MG: 100 CAPSULE, LIQUID FILLED ORAL at 09:23

## 2017-12-27 RX ADMIN — IBUPROFEN SCH MG: 800 TABLET, FILM COATED ORAL at 06:50

## 2017-12-27 RX ADMIN — OXYCODONE AND ACETAMINOPHEN PRN TAB: 5; 325 TABLET ORAL at 23:49

## 2017-12-27 RX ADMIN — DOCUSATE SODIUM SCH MG: 100 CAPSULE, LIQUID FILLED ORAL at 17:49

## 2017-12-27 RX ADMIN — OXYCODONE AND ACETAMINOPHEN PRN TAB: 5; 325 TABLET ORAL at 09:24

## 2017-12-27 RX ADMIN — OXYCODONE AND ACETAMINOPHEN PRN TAB: 5; 325 TABLET ORAL at 19:14

## 2017-12-27 RX ADMIN — DOCUSATE SODIUM SCH MG: 100 CAPSULE, LIQUID FILLED ORAL at 11:11

## 2017-12-27 RX ADMIN — KETOROLAC TROMETHAMINE SCH MG: 30 INJECTION, SOLUTION INTRAMUSCULAR at 02:06

## 2017-12-27 RX ADMIN — DOCUSATE SODIUM SCH MG: 100 CAPSULE, LIQUID FILLED ORAL at 17:50

## 2017-12-27 RX ADMIN — IBUPROFEN SCH MG: 800 TABLET, FILM COATED ORAL at 17:49

## 2017-12-27 NOTE — PDOC PROGRESS REPORT
Subjective-OB


Subjective: 


Post Delivery Day:








27 year old.  Denies any needs at this time 


Doing well, eating well, normal lochia, bottle feeding





Physical Exam (OB)


Vital Signs: 


 











Temp Pulse Resp BP Pulse Ox


 


 98.3 F   75   18   123/71   100 


 


 17 09:05  17 09:05  17 09:05  17 09:05  17 09:05








 Intake & Output











 17





 06:59 06:59 06:59


 


Intake Total  3910 


 


Output Total  1350 


 


Balance  2560 


 


Weight 92.533 kg  














- PIH/Pre-Eclampsia


DTR's: 2 +


Clonus: Negative


Headache: Absent


Epigastric Pain: No


Visual Changes: No





- 


Dressing Removed: No


Incision: Well Approximated


Closure Type: Surgical Glue





- Lochia


Lochia Amount: Scant < 10 ml


Lochia Color: Rubra/Red





- Abdomen


Description: Soft, Round


Hernia Present: No


Fundal Description: Firm, Midline


Fundal Height: u/u - u/2





Objective-Diagnostic


Laboratory: 


 





 17 06:26 





 











  17





  06:26


 


WBC  13.0 H


 


RBC  3.52 L


 


Hgb  10.9 L


 


Hct  32.0 L


 


MCV  91


 


MCH  31.0


 


MCHC  34.0


 


RDW  14.1 H


 


Plt Count  106 L














Assessment and Plan(PN)





- Assessment and Plan


(1) Delivery by  section using transverse incision of lower segment of 

uterus


Is this a current diagnosis for this admission?: Yes   





- Time Spent with Patient


Time with patient: Less than 15 minutes


Smoking Education Provided: Over 3 minutes


Medications reviewed and adjusted accordingly: Yes





- Disposition


Anticipated Discharge: Home


Within: within 24 hours

## 2017-12-28 RX ADMIN — Medication SCH CAP: at 08:43

## 2017-12-28 RX ADMIN — DOCUSATE SODIUM SCH MG: 100 CAPSULE, LIQUID FILLED ORAL at 08:43

## 2017-12-28 RX ADMIN — Medication SCH CAP: at 09:15

## 2017-12-28 RX ADMIN — IBUPROFEN SCH MG: 800 TABLET, FILM COATED ORAL at 05:16

## 2017-12-28 RX ADMIN — OXYCODONE AND ACETAMINOPHEN PRN TAB: 5; 325 TABLET ORAL at 13:00

## 2017-12-28 RX ADMIN — OXYCODONE AND ACETAMINOPHEN PRN TAB: 5; 325 TABLET ORAL at 17:38

## 2017-12-28 RX ADMIN — IBUPROFEN SCH MG: 800 TABLET, FILM COATED ORAL at 17:38

## 2017-12-28 RX ADMIN — DOCUSATE SODIUM SCH MG: 100 CAPSULE, LIQUID FILLED ORAL at 17:29

## 2017-12-28 RX ADMIN — DOCUSATE SODIUM SCH MG: 100 CAPSULE, LIQUID FILLED ORAL at 17:38

## 2017-12-28 RX ADMIN — OXYCODONE AND ACETAMINOPHEN PRN TAB: 5; 325 TABLET ORAL at 22:26

## 2017-12-28 RX ADMIN — OXYCODONE AND ACETAMINOPHEN PRN TAB: 5; 325 TABLET ORAL at 08:44

## 2017-12-28 RX ADMIN — OXYCODONE AND ACETAMINOPHEN PRN TAB: 5; 325 TABLET ORAL at 05:16

## 2017-12-28 RX ADMIN — DOCUSATE SODIUM SCH MG: 100 CAPSULE, LIQUID FILLED ORAL at 09:15

## 2017-12-28 RX ADMIN — IBUPROFEN SCH MG: 800 TABLET, FILM COATED ORAL at 11:36

## 2017-12-28 NOTE — PDOC PROGRESS REPORT
Subjective-OB


Subjective: 


Post Delivery Day:








27 year old.  Denies any needs at this time. bottle feeding, tolerating diet, 

voiding and passing gas, bleeding slowing and pain controlled with current 

medications. 





Physical Exam (OB)


Vital Signs: 


 











Temp Pulse Resp BP Pulse Ox


 


 98.1 F   85   18   133/75 H  99 


 


 17 07:58  17 07:58  17 07:58  17 07:58  17 07:58








 Intake & Output











 17





 06:59 06:59 06:59


 


Intake Total 3910 1935 


 


Output Total 1350  


 


Balance 2560 1935 














- 


Dressing Removed: No


Incision: Open, Well Approximated


Closure Type: Surgical Glue





- Abdomen


Description: Soft


Hernia Present: No


Fundal Description: Firm, Midline


Fundal Height: u/u - u/2





- Abdominal


Tenderness: Nontender





- Extremities


Lower extremities: Elizabeth's sign - neg


Ankle: Normal, Nontender





Objective-Diagnostic


Laboratory: 


 





 17 06:26 











Assessment and Plan(PN)





- Assessment and Plan


(1) Delivery by  section using transverse incision of lower segment of 

uterus


Is this a current diagnosis for this admission?: Yes   





(2) Uterine scar from previous  delivery


Is this a current diagnosis for this admission?: Yes   





- Time Spent with Patient


Time with patient: Less than 15 minutes


Smoking Education Provided: Over 3 minutes


Medications reviewed and adjusted accordingly: Yes





- Disposition


Anticipated Discharge: Home


Within: within 24 hours

## 2017-12-29 VITALS — SYSTOLIC BLOOD PRESSURE: 128 MMHG | DIASTOLIC BLOOD PRESSURE: 79 MMHG

## 2017-12-29 RX ADMIN — Medication SCH CAP: at 09:10

## 2017-12-29 RX ADMIN — DOCUSATE SODIUM SCH MG: 100 CAPSULE, LIQUID FILLED ORAL at 09:10

## 2017-12-29 RX ADMIN — OXYCODONE AND ACETAMINOPHEN PRN TAB: 5; 325 TABLET ORAL at 12:06

## 2017-12-29 RX ADMIN — OXYCODONE AND ACETAMINOPHEN PRN TAB: 5; 325 TABLET ORAL at 08:02

## 2017-12-29 RX ADMIN — IBUPROFEN SCH MG: 800 TABLET, FILM COATED ORAL at 04:52

## 2017-12-29 RX ADMIN — IBUPROFEN SCH MG: 800 TABLET, FILM COATED ORAL at 07:38

## 2017-12-29 RX ADMIN — Medication SCH CAP: at 09:07

## 2017-12-29 RX ADMIN — OXYCODONE AND ACETAMINOPHEN PRN TAB: 5; 325 TABLET ORAL at 03:26

## 2017-12-29 RX ADMIN — IBUPROFEN SCH MG: 800 TABLET, FILM COATED ORAL at 12:06

## 2017-12-29 RX ADMIN — DOCUSATE SODIUM SCH MG: 100 CAPSULE, LIQUID FILLED ORAL at 09:07

## 2017-12-29 NOTE — PDOC DISCHARGE SUMMARY
Final Diagnosis


Discharge Date: 17





- Final Diagnosis


(1) History of  delivery


Is this a current diagnosis for this admission?: Yes   





(2) Delivery by  section using transverse incision of lower segment of 

uterus


Is this a current diagnosis for this admission?: Yes   





(3) Uterine scar from previous  delivery


Is this a current diagnosis for this admission?: Yes   





(4) Acute blood loss anemia


Is this a current diagnosis for this admission?: Yes   





Discharge Data





- Discharge Medication


Prescriptions: 


Oxycodone HCl/Acetaminophen [Percocet 5-325 mg Tablet] 2 tab PO Q4HP PRN #20 

tablet


 PRN Reason: 


Ibuprofen [Motrin 800 mg Tablet] 800 mg PO Q8HP PRN #60 tablet


 PRN Reason: 


Docusate Sodium [Colace 100 mg Capsule] 100 mg PO BID #60 capsule


Home Medications: 








Escitalopram Oxalate [Lexapro 10 mg Tablet] 1 tab PO DAILY 17 


Pnv No.95/Ferrous Fum/Folic AC [Prenatal Multivitamin Tablet] 1 each PO DAILY  


Albuterol Sulfate [Proair HFA] 1 - 2 puff IH PRN PRN 17 


Docusate Sodium [Colace 100 mg Capsule] 100 mg PO BID #60 capsule 17 


Ibuprofen [Motrin 800 mg Tablet] 800 mg PO Q8HP PRN #60 tablet 17 


Oxycodone HCl/Acetaminophen [Percocet 5-325 mg Tablet] 2 tab PO Q4HP PRN #20 

tablet 17 








Reason(s) for Admission: Ceasarean Section-Repeat


Prenatal Procedures: Ultrasound


Intrapartum Procedure(s): : Low Cervical, Transverse





- Diagnosis Test


Laboratory: 


 











Temp Pulse Resp BP Pulse Ox


 


 98.0 F   71   17   134/93 H  100 


 


 17 08:35  17 08:35  17 08:35  17 08:35  17 08:35








 











  17





  10:15 10:30 06:26


 


RBC   4.05  3.52 L


 


Hgb   12.7  10.9 L


 


Hct   37.2  32.0 L


 


Urine Opiates Screen  NEGATIVE  














- Discharge information/Instructions


Discharge Activity: Activity As Tolerated, Balance Activity w/Rest, No Driving, 

No Lifting Over 10 Pounds, No Lifting/Push/Pulling, Pelvic Rest, No tub bath, 

Walk Frequently


Discharge Diet: Regular


Disposition: HOME, SELF-CARE


Follow up with: [facility]


in: 4, Days - bp and incision check

## 2018-12-16 ENCOUNTER — HOSPITAL ENCOUNTER (EMERGENCY)
Dept: HOSPITAL 62 - ER | Age: 28
Discharge: HOME | End: 2018-12-16
Payer: MEDICAID

## 2018-12-16 VITALS — DIASTOLIC BLOOD PRESSURE: 75 MMHG | SYSTOLIC BLOOD PRESSURE: 112 MMHG

## 2018-12-16 DIAGNOSIS — Z88.0: ICD-10-CM

## 2018-12-16 DIAGNOSIS — S82.042A: Primary | ICD-10-CM
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
## 2018-12-16 DIAGNOSIS — Z88.8: ICD-10-CM

## 2018-12-16 DIAGNOSIS — J45.909: ICD-10-CM

## 2018-12-16 DIAGNOSIS — F17.200: ICD-10-CM

## 2018-12-16 PROCEDURE — 99284 EMERGENCY DEPT VISIT MOD MDM: CPT

## 2018-12-16 PROCEDURE — L1830 KO IMMOB CANVAS LONG PRE OTS: HCPCS

## 2018-12-16 PROCEDURE — 96372 THER/PROPH/DIAG INJ SC/IM: CPT

## 2018-12-16 PROCEDURE — 73564 X-RAY EXAM KNEE 4 OR MORE: CPT

## 2018-12-16 NOTE — ER DOCUMENT REPORT
ED Fall





- General


Stated Complaint: FALL KNEE PAIN


Time Seen by Provider: 18 11:19


TRAVEL OUTSIDE OF THE [place] IN LAST 30 DAYS: No





- HPI


Notes: 





Patient is a 28-year-old female that presents to the emergency department for 

chief complaint of left knee injury.


Just prior to arrival patient was walking out her front step and slipped.  She 

states her left knee hit the door frame.  She has a history of patellar 

dislocations in the past and has completed physical therapy on a few occasions 

because of her recurrent patellar dislocations.  She is complaining of a sharp 

pain in her left knee that is worse with movement.  She did have some pain 

relief with Toradol that EMS gave just prior to arrival.  She denies any 

numbness or tingling.





Past Medical History: Negative





Past Surgical History:  x2





Social History: Really tobacco.  Denies drugs and alcohol





Family History: Reviewed and noncontributory for presenting illness





Allergies: Reviewed, see documented allergy list.








REVIEW OF SYSTEMS:





CONSTITUTIONAL : 





No fever





No chills





No diaphoresis





No recent illness





EENT:





No vision changes





No congestion





No sore throat  





CARDIOVASCULAR:





No chest pain





No palpitations





RESPIRATORY:





No shortness of breath





No cough





No difficulty breathing





GASTROINTESTINAL: 





No abdominal pain





No nausea





No vomiting





No diarrhea





GENITOURINARY:





No dysuria





No hematuria





No difficulty urinating





MUSCULOSKELETAL:





No back pain





 leg pain





No arm pain





SKIN:  





No rashes





No lesions





LYMPHATIC: 





No swollen, enlarged glands.





NEUROLOGICAL: 





No lightheadedness





No headache





No weakness





No paresthesias





PSYCHIATRIC:





No anxiety





No depression 








PHYSICAL EXAMINATION:





Vital signs reviewed, nursing noted reviewed.





GENERAL: Well-appearing, well-nourished and in no acute distress.





HEAD: Atraumatic, normocephalic.





EYES: Eyes appear normal, extraocular movements intact, sclera anicteric, 

conjunctiva are normal.





ENT: nares patent, oropharynx clear without exudates.  Moist mucous membranes.





NECK: Normal range of motion, supple without lymphadenopathy





LUNGS: Breath sounds clear to auscultation bilaterally and equal.  No wheezes 

rales or rhonchi.





HEART: Regular rate and rhythm without murmurs.  +2/4 bilateral DP pulse





ABDOMEN: Soft, nontender, normoactive bowel sounds.  No rebound, guarding, or 

rigidity. No masses appreciated.





EXTREMITIES: Left knee tenderness to palpation diffusely, large knee effusion, 

no bony deformity, no patellar dislocation, normal range of motion.,  No joint 

laxity





NEUROLOGICAL: No focal neurological deficits. Moves all extremities 

spontaneously Motor and sensory grossly intact on exam.





PSYCH: Normal mood, normal affect.





SKIN: Warm, Dry, normal turgor, no rashes or lesions noted on exposed skin











- Related data


Allergies/Adverse Reactions: 


 





amoxicillin [Amoxicillin] Allergy (Verified 18 11:21)


 Throat Swells, Hives


hydrocortisone Allergy (Verified 18 11:21)


 Rash


Penicillins Allergy (Verified 18 11:21)


 Throat Swells, Hives











Past Medical History





- Social History


Smoking Status: Current Every Day Smoker


Family History: Reviewed & Not Pertinent


Pulmonary Medical History: Reports: Hx Asthma - albuterol inhaler prn


Renal/ Medical History: Denies: Hx Peritoneal Dialysis


GI Medical History: Reports: Hx Gastroesophageal Reflux Disease - during 

pregnancy


Psychiatric Medical History: Reports: Hx Anxiety, Hx Depression


Past Surgical History: Reports: Hx  Section - May 2009 emergency





- Immunizations


Hx Diphtheria, Pertussis, Tetanus Vaccination: Yes


Hx Pneumococcal Vaccination: 13





Physical Exam





- Vital signs


Vitals: 


 











Temp Pulse Resp BP Pulse Ox


 


 98.7 F   91   16   112/75   100 


 


 18 11:19  18 11:19  18 11:19  18 11:19  18 11:19














Course





- Re-evaluation


Re-evalutation: 





18 11:22


Vitals reviewed.  Nursing notes reviewed.  Patient received Toradol prior to 

arrival but is still in significant pain.  She will be given morphine for 

continued pain management.  She has no patellar or knee dislocation.  X-rays 

will be obtained to evaluate for underlying fracture.  She has good peripheral 

perfusion and sensation.


18 13:03


Patient has a displaced patellar fracture on x-ray.  She was placed in a knee 

immobilizer.  She was told to follow-up with orthopedics tomorrow for 

reevaluation.  She will be given a prescription for pain medication tonight.  

She was counseled on ice and elevation.  She will be given crutches.  

Discharged in stable condition.





 





Knee X-Ray  18 00:00


IMPRESSION:  Fracture of the patella.


 














- Vital Signs


Vital signs: 


 











Temp Pulse Resp BP Pulse Ox


 


 98.7 F   91   16   112/75   100 


 


 18 11:19  18 11:19  18 11:19  18 11:19  18 11:19














Discharge





- Discharge


Clinical Impression: 


Patellar fracture


Qualifiers:


 Encounter type: initial encounter Fracture type: closed Fracture morphology: 

comminuted Fracture alignment: displaced Laterality: left Qualified Code(s): 

S82.042A - Displaced comminuted fracture of left patella, initial encounter for 

closed fracture





Condition: Stable


Disposition: HOME, SELF-CARE


Instructions:  Fractured Patella ([facility]), Knee Immobilizing Splint ([facility])


Additional Instructions: 


Please return to the emergency department if you have any worsening, or concern 

of your symptoms.





Please return to the emergency department if you develop chest pain, difficulty 

breathing, severe abdominal pain, or ongoing vomiting.





Please follow-up with your primary care physician in 2-3 days and any other 

recommended physicians.





If prescribed, take all medications as directed. 





If you have any questions or concerns do not hesitate to return the emergency 

department for evaluation.





Call Dr. Shore, orthopedic surgery, tomorrow to arrange for close follow-up 

appointment.  Do not put weight on your left leg until seen by orthopedic 

surgery.





Prescriptions: 


Oxycodone HCl/Acetaminophen [Percocet 5-325 mg Tablet] 1 tab PO Q4H PRN #15 

tablet


 PRN Reason: pain


Referrals: 


FIDE SAAVEDRA MD [Primary Care Provider] - Follow up as needed


ELVIS SHORE MD [ACTIVE STAFF] - Follow up tomorrow

## 2018-12-16 NOTE — RADIOLOGY REPORT (SQ)
EXAM DESCRIPTION:  KNEE LEFT 4 VIEW



COMPLETED DATE/TIME:  12/16/2018 11:58 am



REASON FOR STUDY:  bed 17 s/p fall with deformity



COMPARISON:  None.



NUMBER OF VIEWS:  Four views.



TECHNIQUE:  AP, lateral, and both oblique radiographic images acquired of the left knee.



LIMITATIONS:  None.



FINDINGS:  MINERALIZATION: Normal.

BONES: Transverse comminuted fracture of the patella 1/2 shaft width displacement.

JOINT: Joint effusion.

SOFT TISSUES: No foreign body.

OTHER: No other significant finding.



IMPRESSION:  Fracture of the patella.



TECHNICAL DOCUMENTATION:  JOB ID:  [id number]

 2011 [employer]- All Rights Reserved



Reading location - IP/workstation name: [email or web address]

## 2018-12-19 ENCOUNTER — HOSPITAL ENCOUNTER (OUTPATIENT)
Dept: HOSPITAL 62 - OROUT | Age: 28
Discharge: HOME | End: 2018-12-19
Attending: ORTHOPAEDIC SURGERY
Payer: MEDICAID

## 2018-12-19 VITALS — SYSTOLIC BLOOD PRESSURE: 130 MMHG | DIASTOLIC BLOOD PRESSURE: 85 MMHG

## 2018-12-19 DIAGNOSIS — F90.9: ICD-10-CM

## 2018-12-19 DIAGNOSIS — S82.032A: Primary | ICD-10-CM

## 2018-12-19 DIAGNOSIS — F32.9: ICD-10-CM

## 2018-12-19 DIAGNOSIS — F17.210: ICD-10-CM

## 2018-12-19 DIAGNOSIS — Z88.0: ICD-10-CM

## 2018-12-19 DIAGNOSIS — Z79.899: ICD-10-CM
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
## 2018-12-19 LAB
ANION GAP SERPL CALC-SCNC: 9 MMOL/L (ref 5–19)
APPEARANCE UR: (no result)
APTT PPP: (no result) S
BILIRUB UR QL STRIP: NEGATIVE
BUN SERPL-MCNC: 12 MG/DL (ref 7–20)
CALCIUM: 9.3 MG/DL (ref 8.4–10.2)
CHLORIDE SERPL-SCNC: 109 MMOL/L (ref 98–107)
CO2 SERPL-SCNC: 23 MMOL/L (ref 22–30)
ERYTHROCYTE [DISTWIDTH] IN BLOOD BY AUTOMATED COUNT: 14.3 % (ref 11.5–14)
GLUCOSE SERPL-MCNC: 87 MG/DL (ref 75–110)
GLUCOSE UR STRIP-MCNC: NEGATIVE MG/DL
HCT VFR BLD CALC: 36.6 % (ref 36–47)
HGB BLD-MCNC: 12.7 G/DL (ref 12–15.5)
KETONES UR STRIP-MCNC: 20 MG/DL
MCH RBC QN AUTO: 32 PG (ref 27–33.4)
MCHC RBC AUTO-ENTMCNC: 34.7 G/DL (ref 32–36)
MCV RBC AUTO: 92 FL (ref 80–97)
NITRITE UR QL STRIP: NEGATIVE
PH UR STRIP: 5 [PH] (ref 5–9)
PLATELET # BLD: 210 10^3/UL (ref 150–450)
POTASSIUM SERPL-SCNC: 3.9 MMOL/L (ref 3.6–5)
PROT UR STRIP-MCNC: 30 MG/DL
RBC # BLD AUTO: 3.97 10^6/UL (ref 3.72–5.28)
SODIUM SERPL-SCNC: 141.4 MMOL/L (ref 137–145)
SP GR UR STRIP: 1.03
UROBILINOGEN UR-MCNC: 2 MG/DL (ref ?–2)
WBC # BLD AUTO: 8.1 10^3/UL (ref 4–10.5)

## 2018-12-19 PROCEDURE — C1713 ANCHOR/SCREW BN/BN,TIS/BN: HCPCS

## 2018-12-19 PROCEDURE — 81001 URINALYSIS AUTO W/SCOPE: CPT

## 2018-12-19 PROCEDURE — 80048 BASIC METABOLIC PNL TOTAL CA: CPT

## 2018-12-19 PROCEDURE — 81025 URINE PREGNANCY TEST: CPT

## 2018-12-19 PROCEDURE — 01392 ANES OPN PX UPR TIB FIB&/PAT: CPT

## 2018-12-19 PROCEDURE — 36415 COLL VENOUS BLD VENIPUNCTURE: CPT

## 2018-12-19 PROCEDURE — 73560 X-RAY EXAM OF KNEE 1 OR 2: CPT

## 2018-12-19 PROCEDURE — 27524 TREAT KNEECAP FRACTURE: CPT

## 2018-12-19 PROCEDURE — 85027 COMPLETE CBC AUTOMATED: CPT

## 2018-12-19 RX ADMIN — LORAZEPAM ONE MG: 2 INJECTION INTRAMUSCULAR; INTRAVENOUS at 17:36

## 2018-12-19 RX ADMIN — FENTANYL CITRATE ONE MCG: 50 INJECTION INTRAMUSCULAR; INTRAVENOUS at 17:25

## 2018-12-19 RX ADMIN — LORAZEPAM ONE MG: 2 INJECTION INTRAMUSCULAR; INTRAVENOUS at 17:41

## 2018-12-19 RX ADMIN — FENTANYL CITRATE ONE MCG: 50 INJECTION INTRAMUSCULAR; INTRAVENOUS at 17:30

## 2018-12-19 NOTE — RADIOLOGY REPORT (SQ)
EXAM DESCRIPTION:  NO CHG FLUORO; KNEE LEFT 2 VIEWS



COMPLETED DATE/TIME:  12/19/2018 4:59 pm



REASON FOR STUDY:  ORIF LT PATELLA



COMPARISON:  None.



FLUOROSCOPY TIME:  0.2 minutes

2 Images saved to PACS



LIMITATIONS:  None.



PROCEDURE:  Internal fixation of patellar fracture.



FINDINGS:  2 images from fluoro shows a placement of wires in the patella.



IMPRESSION:  ORIF patellar fracture.  Refer to operative note for further information.



COMMENT:  PQRS [id number]:  Fluoroscopy time of the procedure is documented in the report.



TECHNICAL DOCUMENTATION:  JOB ID:  [id number]

 2011 [employer]- All Rights Reserved



Reading location - IP/workstation name: [email or web address]

## 2018-12-19 NOTE — OPERATIVE REPORT
Operative Report


DATE OF SURGERY: 12/19/18


PREOPERATIVE DIAGNOSIS: Left patella fracture


OPERATION: Open reduction internal fixation left patella fracture


SURGEON: ELVIS SHORE


ANESTHESIA: GA


ESTIMATED BLOOD LOSS: 100


PROCEDURE: 





With the patient supine on the operative table left lower extremities prepped 

and draped in sterile fashion.  Longitudinal incision was made in the midline 

overlying the left patella.  Sharp dissection was carried incision down to the 

fracture hematoma.  The hematoma was evacuated.  The fracture is irrigated with 

bulb lavage to bit better visualize it.  Its reduced anatomically under direct 

visualization and held in place with a tenaculum.  2 x 2.0 mm pins were then 

placed from distal proximal parallel fashion across the fracture site.  Fracture

reduction and hardware placement checked using fluoroscopic fluoroscopy.  Next a

#5 FiberWire suture was used in a figure-of-eight fashion around the pins.  The 

proximal aspect and distal aspects of the pins are bent and buried in the 

patella.  The medial and lateral retinacular tears were repaired using 

interrupted FiberWire.





The tourniquet is deflated.  Hemostasis obtained with electrocautery.  The wound

is irrigated with bulb lavage.  Is closed using interrupted Vicryl followed by 

staples.  A sterile compressive dressing and a knee immobilizer applied and the 

patient's return to PACU in satisfactory condition.

## 2018-12-19 NOTE — RADIOLOGY REPORT (SQ)
EXAM DESCRIPTION:  NO CHG FLUORO; KNEE LEFT 2 VIEWS



COMPLETED DATE/TIME:  12/19/2018 4:59 pm



REASON FOR STUDY:  ORIF LT PATELLA



COMPARISON:  None.



FLUOROSCOPY TIME:  0.2 minutes

2 Images saved to PACS



LIMITATIONS:  None.



PROCEDURE:  Internal fixation of patellar fracture.



FINDINGS:  2 images from fluoro shows a placement of wires in the patella.



IMPRESSION:  ORIF patellar fracture.  Refer to operative note for further information.



COMMENT:  PQRS [id number]:  Fluoroscopy time of the procedure is documented in the report.



TECHNICAL DOCUMENTATION:  JOB ID:  [id number]

 2011 [employer]- All Rights Reserved



Reading location - IP/workstation name: [email or web address]

## 2018-12-19 NOTE — DISCHARGE SUMMARY
Discharge Summary (SDC)





- Discharge


Final Diagnosis: 





Left patella fracture


Date of Surgery: 12/19/18


Discharge Date: 12/19/18


Condition: Good


Treatment or Instructions: 


Maintain knee immobilizer


Prescriptions: 


Oxycodone HCl/Acetaminophen [Percocet 5-325 mg Tablet] 1 tab PO Q6H PRN #40 

tablet


 PRN Reason: pain


Referrals: 


LARS SINGH MD [Primary Care Provider] - 


Discharge Diet: As Tolerated, Regular


Discharge Activity: Balance Activity w/Rest, No tub bath


Home Care Assistance: None Needed


Adaptive Devices on Discharge: Axillary Crutches


Report the Following to Your Physician Immediately: Shortness of Breath, Fever 

over 101 Degrees, Drainage-Foul Smelling

## 2019-06-21 ENCOUNTER — HOSPITAL ENCOUNTER (EMERGENCY)
Dept: HOSPITAL 62 - ER | Age: 29
Discharge: HOME | End: 2019-06-21
Payer: MEDICAID

## 2019-06-21 VITALS — SYSTOLIC BLOOD PRESSURE: 121 MMHG | DIASTOLIC BLOOD PRESSURE: 57 MMHG

## 2019-06-21 DIAGNOSIS — M25.462: ICD-10-CM

## 2019-06-21 DIAGNOSIS — G89.29: ICD-10-CM
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
## 2019-06-21 DIAGNOSIS — M25.562: Primary | ICD-10-CM
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
## 2019-06-21 PROCEDURE — 99283 EMERGENCY DEPT VISIT LOW MDM: CPT

## 2019-06-21 NOTE — RADIOLOGY REPORT (SQ)
EXAM DESCRIPTION:  KNEE LEFT 4 VIEW



COMPLETED DATE/TIME:  6/21/2019 5:17 pm



REASON FOR STUDY:  left knee pain  s/p injury



COMPARISON:  12/19/2018



NUMBER OF VIEWS:  Four views.



TECHNIQUE:  AP, lateral, and both oblique radiographic images acquired of the left knee.



LIMITATIONS:  None.



FINDINGS:  MINERALIZATION: Normal.

BONES: No acute fracture.  Hardware from the previous patellar fracture appears intact.  There is par
tial nonunion across the patellar fracture.

JOINT: Small joint effusion.

SOFT TISSUES: No soft tissue swelling.  No radio-opaque foreign body.

OTHER: No other significant finding.



IMPRESSION:  No acute fracture.  Hardware femoral prior patellar fracture where there is partial nonu
nion.

Joint effusion.



TECHNICAL DOCUMENTATION:  JOB ID:  [id number]

 2011 [employer]- All Rights Reserved



Reading location - IP/workstation name: [email or web address]

## 2019-06-21 NOTE — ER DOCUMENT REPORT
HPI





- HPI


Time Seen by Provider: 19 16:11


Pain Level: 4


Notes: 





Patient is a 29-year-old female with a history of patellar fracture of the left 

knee in 2018 who presents complaining of acute left knee pain status 

post injury prior to arrival when she was at work.  Patient states that she was 

going to catch some that was falling over and she bent her knee further than it 

has been able to in a long time causing lateral knee pain.  Patient states that 

she placed her knee immobilizer back on her leg to help her ambulate.  Patient 

states that flexion makes her pain worse as well as ambulate him.  She has 

noticed some swelling as well.  Denies any headache, fever, head injury, neck 

pain, URI, sore throat, chest pain, palpitations, syncope, cough, shortness of 

breath, wheeze, dyspnea, abdominal pain, nausea/vomiting/diarrhea, urinary 

retention, dysuria, hematuria, loss of control of bowel or bladder, 

numbness/tingling, saddle anesthesia, muscle paralysis/weakness, or rash.





- ROS


Systems Reviewed and Negative: Yes All other systems reviewed and negative





- REPRODUCTIVE


Reproductive: DENIES: Pregnant:





Past Medical History





- Social History


Smoking Status: Unknown if Ever Smoked


Family History: Reviewed & Not Pertinent





- Past Medical History


Cardiac Medical History: 


   Denies: Hx Coronary Artery Disease, Hx Heart Attack, Hx Hypertension


Pulmonary Medical History: Reports: Hx Asthma - ALBUTEROL, Hx Pneumonia


   Denies: Hx Bronchitis, Hx COPD


Neurological Medical History: Denies: Hx Cerebrovascular Accident, Hx Seizures


Renal/ Medical History: Denies: Hx Peritoneal Dialysis


GI Medical History: Reports: Hx Gastroesophageal Reflux Disease - during 

pregnancy


Musculoskeletal Medical History: Denies Hx Arthritis


Psychiatric Medical History: Reports: Hx Anxiety, Hx Depression


Past Surgical History: Reports: Hx  Section - May 2009 emergency





- Immunizations


Hx Diphtheria, Pertussis, Tetanus Vaccination: Yes


Hx Pneumococcal Vaccination: 13





Vertical Provider Document





- CONSTITUTIONAL


Agree With Documented VS: Yes


Notes: 





PHYSICAL EXAMINATION:





GENERAL: Well-appearing, well-nourished and in no acute distress.





LUNGS: Breath sounds clear to auscultation bilaterally and equal.  No wheezes 

rales or rhonchi.





HEART: Regular rate and rhythm without murmurs, rubs, gallops.





Musculoskeletal: Lt knee:  + mild swelling noted. No ecchymosis or deformity.  

LROM to passive/active and flexion.  + tenderness lateral knee. Strength 5+/5. 

N/V intact distal.  Unable to adequately assess ligaments/cartilage with pt 

resistance and LROM.  No calf tenderness.





Extremities:  No cyanosis, clubbing, or edema b/l.  Peripheral pulses 2+.  

Capillary refill less than 3 seconds.  Elizabeth neg b/l.





NEUROLOGICAL: Normal speech, limping gait.  Normal sensory, motor exams 





PSYCH: Normal mood, normal affect.





SKIN: Warm, Dry, normal turgor, no rashes or lesions noted.





- INFECTION CONTROL


TRAVEL OUTSIDE OF THE [place] IN LAST 30 DAYS: No





Course





- Re-evaluation


Re-evalutation: 





19 17:28


Patient is an afebrile, well-hydrated, 29-year-old female who presents to the ED

with left knee pain acute on chronic.  Vitals are acceptable without any 

significant tachycardia, tachypnea, or hypoxia.  PE is otherwise unremarkable 

for any neurovascular compromise, obvious tendon/ligament rupture, obvious 

fracture/dislocation, septic joint.  X-ray was unremarkable for any acute 

pathology aside from effusion.  Crutches were provided today and pt already in 

knee immobilizer.  Motrin given PO.  Patient is nontoxic-appearing.  Patient is 

able to ambulate and weight-bear although she is limping.  No other labs or 

imaging warranted at this time based on H&P.  Conservative measures otherwise 

for symptoms.  Recheck with your PCM in 3-5 days.  Schedule consult with 

orthopedics.  Return to the ED with any worsening/concerning symptoms otherwise 

as reviewed in discharge.  Patient is in agreement.





- Vital Signs


Vital signs: 


                                        











Temp Pulse Resp BP Pulse Ox


 


 98.4 F   102 H  18   149/82 H  99 


 


 19 15:58  19 15:58  19 15:58  19 15:58  19 15:58














Discharge





- Discharge


Clinical Impression: 


Left knee pain


Qualifiers:


 Chronicity: acute Qualified Code(s): M25.562 - Pain in left knee





Condition: Stable


Disposition: HOME, SELF-CARE


Additional Instructions: 


Rest, Ice, Compression, Elevation


Tylenol/ibuprofen as needed


Light stretches daily


Strength exercises as able


Moist heat and massage may help


F/u with your PCP in 3-5 days for a recheck


Schedule an appointment with orthopedics for further evaluation and management





Return to the ED with any worsening symptoms and/or development of fever, 

headache, chest pain, palpitations, syncope, shortness of breath, trouble 

breathing, abdominal pain, n/v/d, muscle weakness/paralysis, numbness/tingling, 

swelling, redness, or other worsening symptoms that are concerning to you.


Prescriptions: 


Meloxicam [Mobic 7.5 Mg Tablet] 7.5 mg PO BID PRN #14 tablet


 PRN Reason: 


Forms:  Elevated Blood Pressure, Return to Work


Referrals: 


ELVIS SHORE MD [ACTIVE STAFF] - Follow up in 3-5 days

## 2020-03-31 ENCOUNTER — HOSPITAL ENCOUNTER (OUTPATIENT)
Dept: HOSPITAL 62 - RAD | Age: 30
End: 2020-03-31
Attending: NURSE PRACTITIONER
Payer: MEDICAID

## 2020-03-31 DIAGNOSIS — Z34.82: Primary | ICD-10-CM

## 2020-03-31 PROCEDURE — 76805 OB US >/= 14 WKS SNGL FETUS: CPT

## 2020-03-31 NOTE — RADIOLOGY REPORT (SQ)
EXAM DESCRIPTION:  U/S OB 14+ TRNABD 1GES W/O DOP



IMAGES COMPLETED DATE/TIME:  3/31/2020 3:06 pm



REASON FOR STUDY:  Z34.82 ENCOUNTER FOR SUPRVSN OF NORMAL PREGNANCY, SECOND TRIMESTER Z34.82  ENCOUNT
ER FOR SUPRVSN OF NORMAL PREGNANCY, SECOND TRI



COMPARISON:  None.



TECHNIQUE:  Static and Dynamic grayscale imaging performed of gravid uterus using transabdominal appr
oach.  Additional selected color Doppler and spectral images recorded.  All stored on PACS.



LIMITATIONS:  None.



FINDINGS:  FETUSES SEEN:1

EGA: 29 weeks 1 day.  Calculated using BPD,FL,HC,AC documented on images. No discrepancy with clinica
l dates.

MO: 6/15/2020

EFW: 1,317 grams

PERCENTILE: 57

JANET: 10 cm.

PLACENTA: Posterior in location.  Grade 1.

FETAL PRESENTATION: Cephalic.

FETAL ANATOMY:

FETAL HEART RATE: 130 beats per minute.

FOUR CHAMBER HEART: Visualized.

THREE VESSEL CORD: Yes.

CORD INSERTION: Not visualized.

KIDNEYS AND BLADDER: Visualized. Appear normal.

STOMACH: Visualized. Appears normal.

SPINE: Normal as visualized.

BRAIN AND LATERAL VENTRICLES: Visualized. Appear normal.

OTHER: No other significant finding.

MATERNAL ADNEXA: Maternal ovaries not visualized.

CERVICAL LENGTH: 3.9 cm.   Closed.

OTHER: No other significant finding.



IMPRESSION:  LIVING INTRAUTERINE PREGNANCY.

ESTIMATED GESTATIONAL AGE 29 WEEKS 1 DAY.

NO VISUALIZED ANOMALIES.

Trimester of pregnancy: Third trimester - 28 weeks to delivery.



TECHNICAL DOCUMENTATION:  JOB ID:  [id number]

 2011 [employer]- All Rights Reserved



Reading location - IP/workstation name: [email or web address]

## 2020-04-12 ENCOUNTER — HOSPITAL ENCOUNTER (OUTPATIENT)
Dept: HOSPITAL 62 - LC | Age: 30
Discharge: HOME | End: 2020-04-12
Attending: OBSTETRICS & GYNECOLOGY
Payer: MEDICAID

## 2020-04-12 DIAGNOSIS — O21.2: ICD-10-CM

## 2020-04-12 DIAGNOSIS — O99.323: ICD-10-CM

## 2020-04-12 DIAGNOSIS — O99.283: Primary | ICD-10-CM

## 2020-04-12 DIAGNOSIS — O99.333: ICD-10-CM

## 2020-04-12 DIAGNOSIS — F12.90: ICD-10-CM

## 2020-04-12 DIAGNOSIS — F17.210: ICD-10-CM

## 2020-04-12 DIAGNOSIS — Z3A.30: ICD-10-CM

## 2020-04-12 DIAGNOSIS — E86.0: ICD-10-CM

## 2020-04-12 LAB
APPEARANCE UR: (no result)
APTT PPP: (no result) S
BACTERIA (WET MOUNT): (no result)
BARBITURATES UR QL SCN: NEGATIVE
BILIRUB UR QL STRIP: NEGATIVE
CHLAM PCR: NOT DETECTED
EPITHELIALS (WET MOUNT): (no result)
GLUCOSE UR STRIP-MCNC: NEGATIVE MG/DL
KETONES UR STRIP-MCNC: NEGATIVE MG/DL
METHADONE UR QL SCN: NEGATIVE
NITRITE UR QL STRIP: NEGATIVE
PCP UR QL SCN: NEGATIVE
PH UR STRIP: 6 [PH] (ref 5–9)
PROT UR STRIP-MCNC: 30 MG/DL
RBCS (WET MOUNT): (no result)
SP GR UR STRIP: 1.02
T.VAGINALIS (WET MOUNT): (no result)
URINE AMPHETAMINES SCREEN: NEGATIVE
URINE BENZODIAZEPINES SCREEN: NEGATIVE
URINE COCAINE SCREEN: NEGATIVE
URINE MARIJUANA (THC) SCREEN: (no result)
UROBILINOGEN UR-MCNC: 2 MG/DL (ref ?–2)
WBCS (WET MOUNT): (no result)
YEAST (WET MOUNT): (no result)

## 2020-04-12 PROCEDURE — 59899 UNLISTED PX MAT CARE&DLVR: CPT

## 2020-04-12 PROCEDURE — 84112 EVAL AMNIOTIC FLUID PROTEIN: CPT

## 2020-04-12 PROCEDURE — 81001 URINALYSIS AUTO W/SCOPE: CPT

## 2020-04-12 PROCEDURE — 80349 CANNABINOIDS NATURAL: CPT

## 2020-04-12 PROCEDURE — 80307 DRUG TEST PRSMV CHEM ANLYZR: CPT

## 2020-04-12 PROCEDURE — 80361 OPIATES 1 OR MORE: CPT

## 2020-04-12 PROCEDURE — 87591 N.GONORRHOEAE DNA AMP PROB: CPT

## 2020-04-12 PROCEDURE — G0480 DRUG TEST DEF 1-7 CLASSES: HCPCS

## 2020-04-12 PROCEDURE — 87210 SMEAR WET MOUNT SALINE/INK: CPT

## 2020-04-12 PROCEDURE — 87491 CHLMYD TRACH DNA AMP PROBE: CPT

## 2020-06-01 LAB
APPEARANCE UR: (no result)
APTT PPP: YELLOW S
BARBITURATES UR QL SCN: NEGATIVE
BILIRUB UR QL STRIP: NEGATIVE
GLUCOSE UR STRIP-MCNC: NEGATIVE MG/DL
KETONES UR STRIP-MCNC: NEGATIVE MG/DL
METHADONE UR QL SCN: NEGATIVE
NITRITE UR QL STRIP: NEGATIVE
PCP UR QL SCN: NEGATIVE
PH UR STRIP: 8 [PH] (ref 5–9)
PROT UR STRIP-MCNC: 30 MG/DL
SP GR UR STRIP: 1.02
URINE AMPHETAMINES SCREEN: NEGATIVE
URINE BENZODIAZEPINES SCREEN: NEGATIVE
URINE COCAINE SCREEN: NEGATIVE
URINE MARIJUANA (THC) SCREEN: (no result)
UROBILINOGEN UR-MCNC: 2 MG/DL (ref ?–2)

## 2020-06-08 ENCOUNTER — HOSPITAL ENCOUNTER (INPATIENT)
Dept: HOSPITAL 62 - 2S | Age: 30
LOS: 4 days | Discharge: HOME | End: 2020-06-12
Attending: OBSTETRICS & GYNECOLOGY | Admitting: OBSTETRICS & GYNECOLOGY
Payer: MEDICAID

## 2020-06-08 DIAGNOSIS — D62: ICD-10-CM

## 2020-06-08 DIAGNOSIS — Z30.2: ICD-10-CM

## 2020-06-08 DIAGNOSIS — F12.90: ICD-10-CM

## 2020-06-08 DIAGNOSIS — O34.211: ICD-10-CM

## 2020-06-08 DIAGNOSIS — F41.9: ICD-10-CM

## 2020-06-08 DIAGNOSIS — F32.9: ICD-10-CM

## 2020-06-08 DIAGNOSIS — Z88.0: ICD-10-CM

## 2020-06-08 DIAGNOSIS — Z20.828: ICD-10-CM

## 2020-06-08 DIAGNOSIS — Z3A.39: ICD-10-CM

## 2020-06-08 DIAGNOSIS — Z23: ICD-10-CM

## 2020-06-08 DIAGNOSIS — F17.210: ICD-10-CM

## 2020-06-08 LAB
ADD MANUAL DIFF: NO
BASOPHILS # BLD AUTO: 0 10^3/UL (ref 0–0.2)
BASOPHILS NFR BLD AUTO: 0.4 % (ref 0–2)
EOSINOPHIL # BLD AUTO: 0 10^3/UL (ref 0–0.6)
EOSINOPHIL NFR BLD AUTO: 0.5 % (ref 0–6)
ERYTHROCYTE [DISTWIDTH] IN BLOOD BY AUTOMATED COUNT: 13.3 % (ref 11.5–14)
HCT VFR BLD CALC: 37.8 % (ref 36–47)
HGB BLD-MCNC: 13 G/DL (ref 12–15.5)
LYMPHOCYTES # BLD AUTO: 1.5 10^3/UL (ref 0.5–4.7)
LYMPHOCYTES NFR BLD AUTO: 15 % (ref 13–45)
MCH RBC QN AUTO: 31.5 PG (ref 27–33.4)
MCHC RBC AUTO-ENTMCNC: 34.5 G/DL (ref 32–36)
MCV RBC AUTO: 92 FL (ref 80–97)
MONOCYTES # BLD AUTO: 0.5 10^3/UL (ref 0.1–1.4)
MONOCYTES NFR BLD AUTO: 5.3 % (ref 3–13)
NEUTROPHILS # BLD AUTO: 7.8 10^3/UL (ref 1.7–8.2)
NEUTS SEG NFR BLD AUTO: 78.8 % (ref 42–78)
PLATELET # BLD: 145 10^3/UL (ref 150–450)
RBC # BLD AUTO: 4.13 10^6/UL (ref 3.72–5.28)
TOTAL CELLS COUNTED % (AUTO): 100 %
WBC # BLD AUTO: 9.9 10^3/UL (ref 4–10.5)

## 2020-06-08 PROCEDURE — 86901 BLOOD TYPING SEROLOGIC RH(D): CPT

## 2020-06-08 PROCEDURE — 87635 SARS-COV-2 COVID-19 AMP PRB: CPT

## 2020-06-08 PROCEDURE — 36415 COLL VENOUS BLD VENIPUNCTURE: CPT

## 2020-06-08 PROCEDURE — G0480 DRUG TEST DEF 1-7 CLASSES: HCPCS

## 2020-06-08 PROCEDURE — 86900 BLOOD TYPING SEROLOGIC ABO: CPT

## 2020-06-08 PROCEDURE — 90715 TDAP VACCINE 7 YRS/> IM: CPT

## 2020-06-08 PROCEDURE — 80307 DRUG TEST PRSMV CHEM ANLYZR: CPT

## 2020-06-08 PROCEDURE — 59025 FETAL NON-STRESS TEST: CPT

## 2020-06-08 PROCEDURE — 86850 RBC ANTIBODY SCREEN: CPT

## 2020-06-08 PROCEDURE — 94799 UNLISTED PULMONARY SVC/PX: CPT

## 2020-06-08 PROCEDURE — 81001 URINALYSIS AUTO W/SCOPE: CPT

## 2020-06-08 PROCEDURE — 85025 COMPLETE CBC W/AUTO DIFF WBC: CPT

## 2020-06-08 PROCEDURE — 85027 COMPLETE CBC AUTOMATED: CPT

## 2020-06-08 PROCEDURE — C9803 HOPD COVID-19 SPEC COLLECT: HCPCS

## 2020-06-08 PROCEDURE — 94760 N-INVAS EAR/PLS OXIMETRY 1: CPT

## 2020-06-08 PROCEDURE — 80349 CANNABINOIDS NATURAL: CPT

## 2020-06-09 LAB
APPEARANCE UR: (no result)
APTT PPP: (no result) S
BARBITURATES UR QL SCN: NEGATIVE
BILIRUB UR QL STRIP: NEGATIVE
ERYTHROCYTE [DISTWIDTH] IN BLOOD BY AUTOMATED COUNT: 13.4 % (ref 11.5–14)
GLUCOSE UR STRIP-MCNC: NEGATIVE MG/DL
HCT VFR BLD CALC: 38.2 % (ref 36–47)
HGB BLD-MCNC: 12.8 G/DL (ref 12–15.5)
KETONES UR STRIP-MCNC: NEGATIVE MG/DL
MCH RBC QN AUTO: 30.9 PG (ref 27–33.4)
MCHC RBC AUTO-ENTMCNC: 33.6 G/DL (ref 32–36)
MCV RBC AUTO: 92 FL (ref 80–97)
METHADONE UR QL SCN: NEGATIVE
NITRITE UR QL STRIP: NEGATIVE
PCP UR QL SCN: NEGATIVE
PH UR STRIP: 6 [PH] (ref 5–9)
PLATELET # BLD: 131 10^3/UL (ref 150–450)
PROT UR STRIP-MCNC: 30 MG/DL
RBC # BLD AUTO: 4.15 10^6/UL (ref 3.72–5.28)
SP GR UR STRIP: 1.02
URINE AMPHETAMINES SCREEN: NEGATIVE
URINE BENZODIAZEPINES SCREEN: NEGATIVE
URINE COCAINE SCREEN: NEGATIVE
URINE MARIJUANA (THC) SCREEN: (no result)
UROBILINOGEN UR-MCNC: NEGATIVE MG/DL (ref ?–2)
WBC # BLD AUTO: 14.4 10^3/UL (ref 4–10.5)

## 2020-06-09 PROCEDURE — 0UL70CZ OCCLUSION OF BILATERAL FALLOPIAN TUBES WITH EXTRALUMINAL DEVICE, OPEN APPROACH: ICD-10-PCS | Performed by: OBSTETRICS & GYNECOLOGY

## 2020-06-09 RX ADMIN — KETOROLAC TROMETHAMINE SCH MG: 30 INJECTION, SOLUTION INTRAMUSCULAR at 21:59

## 2020-06-09 RX ADMIN — DOCUSATE SODIUM SCH MG: 100 CAPSULE, LIQUID FILLED ORAL at 17:47

## 2020-06-09 RX ADMIN — HYDROMORPHONE HYDROCHLORIDE PRN MG: 2 INJECTION INTRAMUSCULAR; INTRAVENOUS; SUBCUTANEOUS at 23:26

## 2020-06-09 RX ADMIN — HYDROMORPHONE HYDROCHLORIDE PRN MG: 2 INJECTION INTRAMUSCULAR; INTRAVENOUS; SUBCUTANEOUS at 16:36

## 2020-06-09 RX ADMIN — OXYCODONE AND ACETAMINOPHEN PRN TAB: 5; 325 TABLET ORAL at 22:04

## 2020-06-09 RX ADMIN — IBUPROFEN SCH: 800 TABLET, FILM COATED ORAL at 17:46

## 2020-06-09 RX ADMIN — SODIUM CHLORIDE, SODIUM LACTATE, POTASSIUM CHLORIDE, AND CALCIUM CHLORIDE SCH MLS/HR: .6; .31; .03; .02 INJECTION, SOLUTION INTRAVENOUS at 06:22

## 2020-06-09 NOTE — OPERATIVE REPORT
Operative Report


DATE OF SURGERY: 20


PREOPERATIVE DIAGNOSIS: Previous  and desires tubal ligation


POSTOPERATIVE DIAGNOSIS: Same


OPERATION: Repeat  via low transverse uterine incision tube ligation 

with Filshie clips


SURGEON: MIRELLA ADAM


ANESTHESIA: Spinal


COMPLICATIONS: 





None


ESTIMATED BLOOD LOSS: 250 cc


INTRAOPERATIVE FINDINGS: Viable infant crying at delivery.  Fibroid uterus noted

normal tubes and ovaries.


PROCEDURE: 





Patient was taken to the OR and placed in supine position after her spinal 

anesthesia.  She is prepared and draped in sterile fashion.  Mcdonald was placed 

for drainage of the bladder.  Low transverse incision was made and carried down 

the level of the fascia.  The fascial incision was made with knife and extended 

bilaterally with curved Wolf scissors.  The fascia was  off the rectus 

muscles using sharp and blunt dissection.  The rectus muscles are  in 

the midline.  The peritoneum was entered without incident.  Bladder blade was 

placed in uterine segment was identified.  A low transverse incision was made 

creating a bladder flap.  Bladder blade was placed low transverse uterine 

incision was made with the knife and extended with fingertips.  The baby was 

delivered with some fundal pressure.  Mouth and nose were suctioned free.  The 

cord is doubly clamped and cut.  Baby is passed off to the pediatrician in 

attendance.  The placenta was manually extracted with trailing membranes.  The 

uterus was externalized  wrapped in a moist lap sponge.  Uterine contents wiped 

free.  Uterus was closed with a running locking layer of 0 chromic suture using 

the second layer to imbricate the first completing a double layer closure of the

uterus.  The serosa was closed with a running 2-0 chromic stitch.  The fallopian

tubes were identified bilaterally by the fimbria and a Filshie clip was placed 

across each mid isthmic portion.   The pelvis was irrigated and suctioned free 

of fluid the uterus was replaced in the abdomen.  The abdominal wall peritoneum 

was closed with running 2-0 chromic stitch.  Fascia was closed with a running 0 

Vicryl in 2 segments.  Main's layer was brought together with 0 plain gut 

stitch and the skin was closed with running subcuticular 4-0 undyed Vicryl 

stitch.  The wound was dressed mother and baby did well.

## 2020-06-10 LAB
ERYTHROCYTE [DISTWIDTH] IN BLOOD BY AUTOMATED COUNT: 13.3 % (ref 11.5–14)
HCT VFR BLD CALC: 33.8 % (ref 36–47)
HGB BLD-MCNC: 11.4 G/DL (ref 12–15.5)
MCH RBC QN AUTO: 31 PG (ref 27–33.4)
MCHC RBC AUTO-ENTMCNC: 33.6 G/DL (ref 32–36)
MCV RBC AUTO: 92 FL (ref 80–97)
PLATELET # BLD: 118 10^3/UL (ref 150–450)
RBC # BLD AUTO: 3.67 10^6/UL (ref 3.72–5.28)
WBC # BLD AUTO: 10.7 10^3/UL (ref 4–10.5)

## 2020-06-10 RX ADMIN — OXYCODONE AND ACETAMINOPHEN PRN TAB: 5; 325 TABLET ORAL at 08:31

## 2020-06-10 RX ADMIN — IBUPROFEN SCH MG: 800 TABLET, FILM COATED ORAL at 12:44

## 2020-06-10 RX ADMIN — OXYCODONE AND ACETAMINOPHEN PRN TAB: 5; 325 TABLET ORAL at 21:48

## 2020-06-10 RX ADMIN — OXYCODONE AND ACETAMINOPHEN PRN TAB: 5; 325 TABLET ORAL at 17:21

## 2020-06-10 RX ADMIN — IBUPROFEN SCH: 800 TABLET, FILM COATED ORAL at 01:17

## 2020-06-10 RX ADMIN — IBUPROFEN SCH MG: 800 TABLET, FILM COATED ORAL at 17:21

## 2020-06-10 RX ADMIN — DOCUSATE SODIUM SCH MG: 100 CAPSULE, LIQUID FILLED ORAL at 17:20

## 2020-06-10 RX ADMIN — Medication SCH CAP: at 09:48

## 2020-06-10 RX ADMIN — DOCUSATE SODIUM SCH MG: 100 CAPSULE, LIQUID FILLED ORAL at 09:48

## 2020-06-10 RX ADMIN — OXYCODONE AND ACETAMINOPHEN PRN TAB: 5; 325 TABLET ORAL at 03:33

## 2020-06-10 RX ADMIN — OXYCODONE AND ACETAMINOPHEN PRN TAB: 5; 325 TABLET ORAL at 12:44

## 2020-06-10 RX ADMIN — SODIUM CHLORIDE, SODIUM LACTATE, POTASSIUM CHLORIDE, AND CALCIUM CHLORIDE SCH: .6; .31; .03; .02 INJECTION, SOLUTION INTRAVENOUS at 11:11

## 2020-06-10 RX ADMIN — IBUPROFEN SCH: 800 TABLET, FILM COATED ORAL at 07:33

## 2020-06-10 RX ADMIN — KETOROLAC TROMETHAMINE SCH: 30 INJECTION, SOLUTION INTRAMUSCULAR at 17:01

## 2020-06-10 RX ADMIN — KETOROLAC TROMETHAMINE SCH MG: 30 INJECTION, SOLUTION INTRAMUSCULAR at 05:08

## 2020-06-10 NOTE — PDOC PROGRESS REPORT
Subjective-OB


Progress Note for:: 06/10/20


Subjective: 





31yo  s/p repeat  with BTL ppd 1. Pt ambulating and voiding without 

difficulty denies any concerns, reports pain well controlled with medication.





Physical Exam (OB)


Vital Signs: 


                                        











Temp Pulse Resp BP Pulse Ox


 


 97.3 F   61   16   113/72   100 


 


 06/10/20 07:47  06/10/20 07:47  06/10/20 07:47  06/10/20 07:47  06/10/20 07:47








                                 Intake & Output











 06/09/20 06/10/20 06/11/20





 06:59 06:59 06:59


 


Intake Total  2500 600


 


Output Total  1850 


 


Balance  650 600


 


Weight 198 kg  














- General


General Appearance: Appears well


In distress: None





- PIH/Pre-Eclampsia


DTR's: 1 +


Clonus: Negative


Headache: Absent


Epigastric Pain: No


Visual Changes: No





- 


Dressing Removed: No - opsite, scant drain


Incision: Dressing, Draining


Closure Type: opsite





- Lochia


Lochia Amount: Small 10-25 ml


Lochia Color: Rubra/Red





- Abdomen


Description: Soft, Round


Hernia Present: No


Fundal Description: Firm, Midline


Fundal Height: u/u - u/2





- Respiratory


Respiratory Status: No respiratory distress





- Extremities


Upper extremity: Normal inspection


Lower extremities: Normal inspection





- Neurological


Cognition: Normal


Orientation: AAOx4





- Psychological


Associated symptoms: Normal affect, Normal mood





Objective-Diagnostic


Laboratory: 


                                        





                                 06/10/20 06:23 





                                        











  06/09/20 06/10/20





  13:20 06:23


 


WBC  14.4 H  10.7 H


 


RBC  4.15  3.67 L


 


Hgb  12.8  11.4 L


 


Hct  38.2  33.8 L


 


MCV  92  92


 


MCH  30.9  31.0


 


MCHC  33.6  33.6


 


RDW  13.4  13.3


 


Plt Count  131 L  118 L














Assessment and Plan(PN)





- Assessment and Plan


(1) Status post repeat low transverse  section


Is this a current diagnosis for this admission?: Yes   


Plan: 


 routine pp care 








(2) Encounter for tubal ligation


Is this a current diagnosis for this admission?: Yes   


Plan: 


routine pp care








(3) Delivery by  section using transverse incision of lower segment of 

uterus


Is this a current diagnosis for this admission?: Yes   


Plan: 


 routine pp care








(4) History of  delivery


Is this a current diagnosis for this admission?: Yes   


Plan: 


delivered








(5) Uterine scar from previous  delivery


Is this a current diagnosis for this admission?: Yes   


Plan: 


delivered 








- Time Spent with Patient


Time with patient: Less than 15 minutes


Medications reviewed and adjusted accordingly: Yes





- Disposition


Anticipated Discharge: Home


Within: within 24 hours

## 2020-06-11 LAB
ERYTHROCYTE [DISTWIDTH] IN BLOOD BY AUTOMATED COUNT: 13.3 % (ref 11.5–14)
HCT VFR BLD CALC: 32.4 % (ref 36–47)
HGB BLD-MCNC: 10.9 G/DL (ref 12–15.5)
MCH RBC QN AUTO: 31 PG (ref 27–33.4)
MCHC RBC AUTO-ENTMCNC: 33.5 G/DL (ref 32–36)
MCV RBC AUTO: 93 FL (ref 80–97)
PLATELET # BLD: 118 10^3/UL (ref 150–450)
RBC # BLD AUTO: 3.5 10^6/UL (ref 3.72–5.28)
WBC # BLD AUTO: 7 10^3/UL (ref 4–10.5)

## 2020-06-11 RX ADMIN — IBUPROFEN SCH MG: 800 TABLET, FILM COATED ORAL at 06:00

## 2020-06-11 RX ADMIN — IBUPROFEN SCH MG: 800 TABLET, FILM COATED ORAL at 12:47

## 2020-06-11 RX ADMIN — OXYCODONE AND ACETAMINOPHEN PRN TAB: 5; 325 TABLET ORAL at 17:24

## 2020-06-11 RX ADMIN — DOCUSATE SODIUM SCH MG: 100 CAPSULE, LIQUID FILLED ORAL at 10:44

## 2020-06-11 RX ADMIN — OXYCODONE AND ACETAMINOPHEN PRN TAB: 5; 325 TABLET ORAL at 12:46

## 2020-06-11 RX ADMIN — IBUPROFEN SCH MG: 800 TABLET, FILM COATED ORAL at 01:53

## 2020-06-11 RX ADMIN — Medication SCH CAP: at 10:44

## 2020-06-11 RX ADMIN — OXYCODONE AND ACETAMINOPHEN PRN TAB: 5; 325 TABLET ORAL at 01:53

## 2020-06-11 RX ADMIN — DOCUSATE SODIUM SCH MG: 100 CAPSULE, LIQUID FILLED ORAL at 17:24

## 2020-06-11 RX ADMIN — IBUPROFEN SCH MG: 800 TABLET, FILM COATED ORAL at 17:24

## 2020-06-11 RX ADMIN — OXYCODONE AND ACETAMINOPHEN PRN TAB: 5; 325 TABLET ORAL at 07:52

## 2020-06-11 RX ADMIN — OXYCODONE AND ACETAMINOPHEN PRN TAB: 5; 325 TABLET ORAL at 22:50

## 2020-06-11 NOTE — PDOC PROGRESS REPORT
Subjective-OB


Progress Note for:: 20 - POD #2, doing well, UOB, ambulating and voiding ,

O+. s/p Rpt  w/ BTL





Physical Exam (OB)


Vital Signs: 


                                        











Temp Pulse Resp BP Pulse Ox


 


 97.9 F   57 L  16   117/67   100 


 


 20 07:48  20 07:48  20 07:48  20 07:48  20 07:48








                                 Intake & Output











 06/10/20 06/11/20 06/12/20





 06:59 06:59 06:59


 


Intake Total 2500 3480 600


 


Output Total 1850  


 


Balance 650 3480 600














- General


General Appearance: Appears well, Alert





- PIH/Pre-Eclampsia


DTR's: 1 +


Clonus: Negative


Headache: Absent


Epigastric Pain: No


Visual Changes: No





- 


Dressing Removed: Yes - opsite with scant old shadow drainage


Incision: Dressing


Closure Type: opsite





- Lochia


Lochia Amount: Small 10-25 ml


Lochia Color: Rubra/Red





- Abdomen


Description: Soft, Round


Hernia Present: No


Fundal Description: Firm, Midline


Fundal Height: u/u - u/2





- Respiratory


Respiratory Status: No respiratory distress


Breath sounds: Clear





- Cardiovascular


Rhythm: Regular


Heart Sounds: Normal auscultation





- Abdominal


Distension: No distension


Tenderness: Nontender


Abdominal Notes: 





+BS present





- Genitourinary


Genitourinary Note: 





voiding





- Extremities


Upper extremity: Normal inspection


Lower extremities: Normal inspection





- Neurological


Cognition: Normal


Orientation: AAOx4





- Psychological


Associated symptoms: Normal affect, Normal mood





- Skin


Skin Temperature: Warm


Skin Moisture: Dry





Objective-Diagnostic


Laboratory: 


                                        





                                 20 07:39 





                                        











  20





  07:39


 


WBC  7.0


 


RBC  3.50 L


 


Hgb  10.9 L


 


Hct  32.4 L


 


MCV  93


 


MCH  31.0


 


MCHC  33.5


 


RDW  13.3


 


Plt Count  118 L














Assessment and Plan(PN)





- Assessment and Plan


(1) Encounter for tubal ligation


Is this a current diagnosis for this admission?: Yes   





(2) Status post repeat low transverse  section


Is this a current diagnosis for this admission?: Yes   





(3) Acute blood loss anemia


Is this a current diagnosis for this admission?: Yes   





(4) Delivery by  section using transverse incision of lower segment of 

uterus


Is this a current diagnosis for this admission?: Yes   





(5) History of  delivery


Is this a current diagnosis for this admission?: Yes   





(6) Uterine scar from previous  delivery


Is this a current diagnosis for this admission?: Yes   


Plan:: 





routine Post Op and PP orders, ambulation encouraged





- Time Spent with Patient


Time with patient: Less than 15 minutes


Medications reviewed and adjusted accordingly: Yes





- Disposition


Anticipated Discharge: Home


Within: within 24 hours

## 2020-06-12 VITALS — SYSTOLIC BLOOD PRESSURE: 115 MMHG | DIASTOLIC BLOOD PRESSURE: 74 MMHG

## 2020-06-12 PROCEDURE — 3E0234Z INTRODUCTION OF SERUM, TOXOID AND VACCINE INTO MUSCLE, PERCUTANEOUS APPROACH: ICD-10-PCS | Performed by: OBSTETRICS & GYNECOLOGY

## 2020-06-12 RX ADMIN — IBUPROFEN SCH MG: 800 TABLET, FILM COATED ORAL at 06:23

## 2020-06-12 RX ADMIN — IBUPROFEN SCH MG: 800 TABLET, FILM COATED ORAL at 11:59

## 2020-06-12 RX ADMIN — OXYCODONE AND ACETAMINOPHEN PRN TAB: 5; 325 TABLET ORAL at 08:19

## 2020-06-12 RX ADMIN — OXYCODONE AND ACETAMINOPHEN PRN TAB: 5; 325 TABLET ORAL at 13:02

## 2020-06-12 RX ADMIN — DOCUSATE SODIUM SCH MG: 100 CAPSULE, LIQUID FILLED ORAL at 09:39

## 2020-06-12 RX ADMIN — OXYCODONE AND ACETAMINOPHEN PRN TAB: 5; 325 TABLET ORAL at 03:22

## 2020-06-12 RX ADMIN — IBUPROFEN SCH MG: 800 TABLET, FILM COATED ORAL at 00:38

## 2020-06-12 RX ADMIN — Medication SCH CAP: at 09:39

## 2020-06-12 NOTE — PDOC DISCHARGE SUMMARY
Impression





- Admit/DC Date/PCP


Admission Date/Primary Care Provider: 


  20 05:09





  LARS HADLEY MD





Discharge Date: 20





- Discharge Diagnosis


(1) Encounter for tubal ligation


Is this a current diagnosis for this admission?: Yes   





(2) Status post repeat low transverse  section


Is this a current diagnosis for this admission?: Yes   





(3) Acute blood loss anemia


Is this a current diagnosis for this admission?: Yes   





- Additional Information


Resuscitation Status: Full Code


Discharge Diet: As Tolerated, Regular


Discharge Activity: Activity As Tolerated, Balance Activity w/Rest, No Lifting 

Over 10 Pounds, No Lifting/Push/Pulling, Pelvic Rest, No tub bath


Referrals: 


LARS HADLEY MD [Primary Care Provider] -  (Follow up in 1 week for incision 

check.  Call the office and make an appointment.  )


Prescriptions: 


Oxycodone HCl/Acetaminophen [Percocet 5-325 mg Tablet] 1 tab PO Q4HP PRN #20 

tablet


 PRN Reason: 


Ibuprofen [Motrin 800 mg Tablet] 800 mg PO Q6 #30 tablet


Home Medications: 








Prenatal Vit,Calc76/Iron/Folic [Prenatabs Rx Tablet] 1 each PO DAILY 20 


Ibuprofen [Motrin 800 mg Tablet] 800 mg PO Q6 #30 tablet 20 


Oxycodone HCl/Acetaminophen [Percocet 5-325 mg Tablet] 1 tab PO Q4HP PRN #20 

tablet 20 











HPI


Gestational Age: 39


Reason(s) for Admission: Ceasarean Section-Repeat, Tubal Ligation


Prenatal Procedures: Ultrasound


Intrapartum Procedure(s): : Low Cervical, Transverse





Hospital Course


Hospital Course: 


routine post op





Results


Laboratory Results: 


                                        











WBC  7.0 10^3/uL (4.0-10.5)   20  07:39    


 


RBC  3.50 10^6/uL (3.72-5.28)  L  20  07:39    


 


Hgb  10.9 g/dL (12.0-15.5)  L  20  07:39    


 


Hct  32.4 % (36.0-47.0)  L  20  07:39    


 


MCV  93 fl (80-97)   20  07:39    


 


MCH  31.0 pg (27.0-33.4)   20  07:39    


 


MCHC  33.5 g/dL (32.0-36.0)   20  07:39    


 


RDW  13.3 % (11.5-14.0)   20  07:39    


 


Plt Count  118 10^3/uL (150-450)  L  20  07:39    


 


Lymph % (Auto)  15.0 % (13-45)   20  14:55    


 


Mono % (Auto)  5.3 % (3-13)   20  14:55    


 


Eos % (Auto)  0.5 % (0-6)   20  14:55    


 


Baso % (Auto)  0.4 % (0-2)   20  14:55    


 


Absolute Neuts (auto)  7.8 10^3/uL (1.7-8.2)   20  14:55    


 


Absolute Lymphs (auto)  1.5 10^3/uL (0.5-4.7)   20  14:55    


 


Absolute Monos (auto)  0.5 10^3/uL (0.1-1.4)   20  14:55    


 


Absolute Eos (auto)  0.0 10^3/uL (0.0-0.6)   20  14:55    


 


Absolute Basos (auto)  0.0 10^3/uL (0.0-0.2)   20  14:55    


 


Seg Neutrophils %  78.8 % (42-78)  H  20  14:55    


 


Urine Color  MARCO   20  05:45    


 


Urine Appearance  CLOUDY   20  05:45    


 


Urine pH  6.0  (5.0-9.0)   20  05:45    


 


Ur Specific Gravity  1.021   20  05:45    


 


Urine Protein  30 mg/dL (NEGATIVE)  H  20  05:45    


 


Urine Glucose (UA)  NEGATIVE mg/dL (NEGATIVE)   20  05:45    


 


Urine Ketones  NEGATIVE mg/dL (NEGATIVE)   20  05:45    


 


Urine Blood  NEGATIVE  (NEGATIVE)   20  05:45    


 


Urine Nitrite  NEGATIVE  (NEGATIVE)   20  05:45    


 


Urine Bilirubin  NEGATIVE  (NEGATIVE)   20  05:45    


 


Urine Urobilinogen  NEGATIVE mg/dL (<2.0)   20  05:45    


 


Ur Leukocyte Esterase  TRACE  (NEGATIVE)  H  20  05:45    


 


Urine WBC (Auto)  1 /HPF  20  05:45    


 


Urine RBC (Auto)  2 /HPF  20  05:45    


 


Urine Bacteria (Auto)  TRACE /HPF  20  05:45    


 


Squamous Epi Cells Auto  65 /HPF  20  05:45    


 


Urine Mucus (Auto)  MOD /LPF  20  05:45    


 


Urine Ascorbic Acid  NEGATIVE  (NEGATIVE)   20  05:45    


 


Urine Opiates Screen  NEGATIVE   20  05:45    


 


Urine Methadone Screen  NEGATIVE   20  05:45    


 


Ur Barbiturates Screen  NEGATIVE   20  05:45    


 


Ur Phencyclidine Scrn  NEGATIVE   20  05:45    


 


Ur Amphetamines Screen  NEGATIVE   20  05:45    


 


U Benzodiazepines Scrn  NEGATIVE   20  05:45    


 


Urine Cocaine Screen  NEGATIVE   20  05:45    


 


U Marijuana (THC) Screen  UNCONFIRMED POSITIVE   20  05:45    


 


SARS-CoV-2 (PCR)  NEGATIVE  (NEGATIVE)   20  08:35    


 


Blood Type  O POSITIVE   20  14:55    


 


Antibody Screen  NEGATIVE   20  14:55    














Plan


Health Concerns: 


post op pain


Plan of Treatment: 


discharge home, regular diet


Goals: 


no complications


Time Spent: Less than 30 Minutes

## 2020-06-12 NOTE — PDOC PROGRESS REPORT
Subjective-OB


Progress Note for:: 20


Subjective: 





Sitting up in bed with baby, no c/o, voiding, passing gas, pain under control, 

ready to go home





Physical Exam (OB)


Vital Signs: 


                                        











Temp Pulse Resp BP Pulse Ox


 


 97.6 F   60   16   115/74   99 


 


 20 08:39  20 08:39  20 08:39  20 08:39  20 08:39








                                 Intake & Output











 20





 06:59 06:59 06:59


 


Intake Total 3480 1550 


 


Balance 3480 1550 














- PIH/Pre-Eclampsia


DTR's: 1 +


Clonus: Negative


Headache: Absent


Epigastric Pain: No


Visual Changes: No





- 


Dressing Removed: Yes - opsite with scant old shadow drainage


Incision: Open


Closure Type: opsite





- Bilateral Tubal Ligation


Site: Open





- Lochia


Lochia Amount: Scant < 10 ml


Lochia Color: Rubra/Red





- Abdomen


Description: Soft, Flat


Hernia Present: No


Fundal Description: Firm, Midline


Fundal Height: u/u - u/2





Objective-Diagnostic


Laboratory: 


                                        





                                 20 07:39 











Assessment and Plan(PN)





- Assessment and Plan


(1) Encounter for tubal ligation


Is this a current diagnosis for this admission?: Yes   





(2) Status post repeat low transverse  section


Is this a current diagnosis for this admission?: Yes   





(3) Acute blood loss anemia


Is this a current diagnosis for this admission?: Yes   





- Time Spent with Patient


Time with patient: Less than 15 minutes


Medications reviewed and adjusted accordingly: Yes





- Disposition


Anticipated Discharge: Home


Within: within 24 hours

## 2021-01-11 ENCOUNTER — HOSPITAL ENCOUNTER (EMERGENCY)
Dept: HOSPITAL 62 - ER | Age: 31
Discharge: LEFT BEFORE DISCHARGE | End: 2021-01-11
Payer: MEDICAID

## 2021-01-11 VITALS — DIASTOLIC BLOOD PRESSURE: 69 MMHG | SYSTOLIC BLOOD PRESSURE: 123 MMHG

## 2021-01-11 DIAGNOSIS — Z53.21: Primary | ICD-10-CM
